# Patient Record
Sex: MALE | Race: WHITE | NOT HISPANIC OR LATINO | Employment: FULL TIME | URBAN - METROPOLITAN AREA
[De-identification: names, ages, dates, MRNs, and addresses within clinical notes are randomized per-mention and may not be internally consistent; named-entity substitution may affect disease eponyms.]

---

## 2017-10-16 ENCOUNTER — OFFICE VISIT (OUTPATIENT)
Dept: URGENT CARE | Facility: CLINIC | Age: 37
End: 2017-10-16
Payer: COMMERCIAL

## 2017-10-16 PROCEDURE — S9083 URGENT CARE CENTER GLOBAL: HCPCS

## 2017-10-16 PROCEDURE — G0382 LEV 3 HOSP TYPE B ED VISIT: HCPCS

## 2017-10-17 NOTE — PROGRESS NOTES
Assessment  1  Conjunctivitis, right eye (372 30) (H10 9)    Plan  Conjunctivitis, right eye    · Tobramycin-Dexamethasone 0 3-0 1 % Ophthalmic Suspension; INSTILL 1 DROP  IN RIGHT EYE EVERY 4 HOURS DAILY    Discussion/Summary  Discussion Summary:   Eye drops R eye every 4 hours while awake; recheck with DR Carlton Ospina in 2-3 days if not improving or vision worsens [phone]  Chief Complaint  Chief Complaint Free Text Note Form: For two days the patient has had a red, swollen and itchy right eye  He removed his contact from the right eye when he developed the swelling and redness  He denies any pain or injury to the eye  History of Present Illness  HPI: R eye red, lids swollen, eye itchy, draining some; did remove contact lens (long wear); eye better today than yesterday   Hospital Based Practices Required Assessment:   Pain Assessment   the patient states they do not have pain  Abuse And Domestic Violence Screen    Yes, the patient is safe at home  -- The patient states no one is hurting them  Depression And Suicide Screen  No, the patient has not had thoughts of hurting themself  No, the patient has not felt depressed in the past 7 days  Prefered Language is  [place]  Primary Language is  English  Review of Systems  Focused-Male:   Constitutional: no fever or chills, feels well, no tiredness, no recent weight loss or weight gain  ENT: no complaints of earache, no loss of hearing, no nosebleeds or nasal discharge, no sore throat or hoarseness  Cardiovascular: no complaints of slow or fast heart rate, no chest pain, no palpitations, no leg claudication or lower extremity edema  Respiratory: no complaints of shortness of breath, no wheezing or cough, no dyspnea on exertion, no orthopnea or PND  Gastrointestinal: no complaints of abdominal pain, no constipation, no nausea or vomiting, no diarrhea or bloody stools     Genitourinary: no complaints of dysuria or incontinence, no hesitancy, no nocturia, no genital lesion, no inadequacy of penile erection  Musculoskeletal: no complaints of arthralgia, no myalgia, no joint swelling or stiffness, no limb pain or swelling  Integumentary: no complaints of skin rash or lesion, no itching or dry skin, no skin wounds  Neurological: no complaints of headache, no confusion, no numbness or tingling, no dizziness or fainting  Other Symptoms: R eye red, itchy, swollen  Physical Exam    Constitutional   General appearance: No acute distress, well appearing and well nourished  Eyes   Conjunctiva and lids: Abnormal  -- lids edematous on R, conjunctiva very injected  Pupils and irises: Abnormal  -- fluorescein exam negative  Ears, Nose, Mouth, and Throat   External inspection of ears and nose: Normal     Otoscopic examination: Tympanic membrance translucent with normal light reflex  Canals patent without erythema  Nasal mucosa, septum, and turbinates: Normal without edema or erythema  Oropharynx: Normal with no erythema, edema, exudate or lesions  Pulmonary   Respiratory effort: No increased work of breathing or signs of respiratory distress  Auscultation of lungs: Clear to auscultation  Cardiovascular   Palpation of heart: Normal PMI, no thrills  Auscultation of heart: Normal rate and rhythm, normal S1 and S2, without murmurs  Examination of extremities for edema and/or varicosities: Normal     Abdomen   Abdomen: Non-tender, no masses  Liver and spleen: No hepatomegaly or splenomegaly  Lymphatic   Palpation of lymph nodes in neck: No lymphadenopathy  Musculoskeletal   Gait and station: Normal     Digits and nails: Normal without clubbing or cyanosis  Inspection/palpation of joints, bones, and muscles: Normal     Skin   Skin and subcutaneous tissue: Normal without rashes or lesions  Neurologic   Cranial nerves: Cranial nerves 2-12 intact  Reflexes: 2+ and symmetric  Sensation: No sensory loss  Psychiatric   Orientation to person, place and time: Normal     Mood and affect: Normal        Signatures   Electronically signed by : GEMMA Fonseca ; Oct 16 2017 11:21AM EST                       (Author)

## 2019-07-29 ENCOUNTER — OFFICE VISIT (OUTPATIENT)
Dept: URGENT CARE | Facility: CLINIC | Age: 39
End: 2019-07-29
Payer: COMMERCIAL

## 2019-07-29 VITALS
WEIGHT: 182.2 LBS | OXYGEN SATURATION: 98 % | TEMPERATURE: 97.6 F | HEART RATE: 78 BPM | BODY MASS INDEX: 29.28 KG/M2 | RESPIRATION RATE: 16 BRPM | HEIGHT: 66 IN | SYSTOLIC BLOOD PRESSURE: 128 MMHG | DIASTOLIC BLOOD PRESSURE: 79 MMHG

## 2019-07-29 DIAGNOSIS — H18.821 CORNEAL ABRASION DUE TO CONTACT LENS, RIGHT: ICD-10-CM

## 2019-07-29 DIAGNOSIS — H57.11 EYE PAIN, RIGHT: Primary | ICD-10-CM

## 2019-07-29 PROCEDURE — 99213 OFFICE O/P EST LOW 20 MIN: CPT | Performed by: EMERGENCY MEDICINE

## 2019-07-29 RX ORDER — OLANZAPINE 15 MG/1
15 TABLET ORAL DAILY
Refills: 0 | COMMUNITY
Start: 2019-07-07

## 2019-07-29 RX ORDER — TOBRAMYCIN 3 MG/ML
1 SOLUTION/ DROPS OPHTHALMIC
Qty: 1 BOTTLE | Refills: 0 | Status: SHIPPED | OUTPATIENT
Start: 2019-07-29 | End: 2021-08-24 | Stop reason: ALTCHOICE

## 2019-07-29 NOTE — PROGRESS NOTES
Assessment/Plan:    No problem-specific Assessment & Plan notes found for this encounter  Diagnoses and all orders for this visit:    Eye pain, right  -     fluorescein sodium sterile 1 mg ophthalmic strip 1 strip    Corneal abrasion due to contact lens, right  -     tobramycin (TOBREX) 0 3 % SOLN; Administer 1 drop to the right eye every 4 (four) hours while awake    Other orders  -     OLANZapine (ZyPREXA) 15 mg tablet; Take 15 mg by mouth daily          Subjective:      Patient ID: Hollis Edwards is a 42 y o  male  Pt c/o pain, redness, tearing R eye since last evening - took contact lenses out  Eye Pain    The right eye is affected  This is a new problem  The current episode started yesterday  The problem occurs constantly  The problem has been unchanged  The injury mechanism was contact lenses  The pain is at a severity of 3/10  The pain is mild  There is no known exposure to pink eye  He wears contacts  Associated symptoms include blurred vision, an eye discharge, eye redness, a foreign body sensation and photophobia  He has tried nothing for the symptoms  The treatment provided no relief  The following portions of the patient's history were reviewed and updated as appropriate: allergies, current medications, past family history, past medical history, past social history, past surgical history and problem list     Review of Systems   Eyes: Positive for blurred vision, photophobia, pain, discharge and redness  All other systems reviewed and are negative  Objective:      /79   Pulse 78   Temp 97 6 °F (36 4 °C)   Resp 16   Ht 5' 6" (1 676 m)   Wt 82 6 kg (182 lb 3 2 oz)   SpO2 98%   BMI 29 41 kg/m²          Physical Exam   Constitutional: He is oriented to person, place, and time  He appears well-developed and well-nourished  HENT:   Nose: Nose normal    Eyes: Pupils are equal, round, and reactive to light  EOM are normal  Right conjunctiva is injected         Neck: Normal range of motion  Cardiovascular: Normal rate  Pulmonary/Chest: Effort normal    Abdominal: Soft  Neurological: He is alert and oriented to person, place, and time  Skin: Skin is warm and dry  Psychiatric: He has a normal mood and affect  His behavior is normal  Judgment and thought content normal    Nursing note and vitals reviewed

## 2021-02-19 ENCOUNTER — OFFICE VISIT (OUTPATIENT)
Dept: URGENT CARE | Facility: CLINIC | Age: 41
End: 2021-02-19
Payer: COMMERCIAL

## 2021-02-19 VITALS
HEIGHT: 66 IN | BODY MASS INDEX: 28.93 KG/M2 | WEIGHT: 180 LBS | TEMPERATURE: 97.4 F | HEART RATE: 70 BPM | RESPIRATION RATE: 16 BRPM | OXYGEN SATURATION: 99 %

## 2021-02-19 DIAGNOSIS — H10.9 CONJUNCTIVITIS OF LEFT EYE, UNSPECIFIED CONJUNCTIVITIS TYPE: Primary | ICD-10-CM

## 2021-02-19 PROCEDURE — 99213 OFFICE O/P EST LOW 20 MIN: CPT | Performed by: PREVENTIVE MEDICINE

## 2021-02-19 RX ORDER — GENTAMICIN SULFATE 3 MG/ML
1 SOLUTION/ DROPS OPHTHALMIC EVERY 4 HOURS
Qty: 5 ML | Refills: 0 | Status: SHIPPED | OUTPATIENT
Start: 2021-02-19 | End: 2021-08-24 | Stop reason: ALTCHOICE

## 2021-02-19 NOTE — PATIENT INSTRUCTIONS
This is the 3rd time you have had a red eye  I believe you need to follow-up as soon as possible with an ophthalmologist   If the eyes not much improved by Monday you must go to the emergency room

## 2021-02-19 NOTE — PROGRESS NOTES
[facility]        NAME: Frank Graham is a 47 y o  male  : 1980    MRN: [id number]  DATE: 2021  TIME: 12:45 PM    Assessment and Plan   Conjunctivitis of left eye, unspecified conjunctivitis type [H10 9]  1  Conjunctivitis of left eye, unspecified conjunctivitis type  gentamicin (GARAMYCIN) 0 3 % ophthalmic solution         Patient Instructions       Follow up with PCP in 3-5 days  Proceed to  ER if symptoms worsen  Chief Complaint     Chief Complaint   Patient presents with    Eye Pain     left eye light sensitivity, redness and watery discharge         History of Present Illness        This is the 3rd time in the last several months these had a reddened irritated left eye  He uses soft contacts  He does not feels if anything got in the eye and the eye does not feel like there is a foreign body in it  Review of Systems   Review of Systems   Eyes: Positive for discharge and redness  Negative for pain and itching  Current Medications       Current Outpatient Medications:     OLANZapine (ZyPREXA) 15 mg tablet, Take 15 mg by mouth daily, Disp: , Rfl: 0    gentamicin (GARAMYCIN) 0 3 % ophthalmic solution, Administer 1 drop into the left eye every 4 (four) hours, Disp: 5 mL, Rfl: 0    tobramycin (TOBREX) 0 3 % SOLN, Administer 1 drop to the right eye every 4 (four) hours while awake (Patient not taking: Reported on 2021), Disp: 1 Bottle, Rfl: 0    Current Allergies     Allergies as of 2021    (No Known Allergies)            The following portions of the patient's history were reviewed and updated as appropriate: allergies, current medications, past family history, past medical history, past social history, past surgical history and problem list      Past Medical History:   Diagnosis Date    Patient denies medical problems 2019       No past surgical history on file      Family History   Problem Relation Age of Onset    Diabetes Mother     Diabetes Father          Medications have been verified  Objective   Pulse 70   Temp (!) 97 4 °F (36 3 °C)   Resp 16   Ht 5' 6" (1 676 m)   Wt 81 6 kg (180 lb)   SpO2 99%   BMI 29 05 kg/m²   No LMP for male patient  Physical Exam     Physical Exam  Eyes:      Comments: The pupil is round equal to light and accommodation  The sclera is erythematous and injected  The conjunctiva is pink

## 2021-07-12 ENCOUNTER — OFFICE VISIT (OUTPATIENT)
Dept: URGENT CARE | Facility: CLINIC | Age: 41
End: 2021-07-12
Payer: COMMERCIAL

## 2021-07-12 VITALS
HEART RATE: 128 BPM | RESPIRATION RATE: 20 BRPM | WEIGHT: 178 LBS | TEMPERATURE: 101.1 F | OXYGEN SATURATION: 96 % | HEIGHT: 66 IN | BODY MASS INDEX: 28.61 KG/M2 | DIASTOLIC BLOOD PRESSURE: 70 MMHG | SYSTOLIC BLOOD PRESSURE: 100 MMHG

## 2021-07-12 DIAGNOSIS — B34.9 ACUTE VIRAL SYNDROME: ICD-10-CM

## 2021-07-12 DIAGNOSIS — R50.9 FEVER, UNSPECIFIED: Primary | ICD-10-CM

## 2021-07-12 PROCEDURE — U0003 INFECTIOUS AGENT DETECTION BY NUCLEIC ACID (DNA OR RNA); SEVERE ACUTE RESPIRATORY SYNDROME CORONAVIRUS 2 (SARS-COV-2) (CORONAVIRUS DISEASE [COVID-19]), AMPLIFIED PROBE TECHNIQUE, MAKING USE OF HIGH THROUGHPUT TECHNOLOGIES AS DESCRIBED BY CMS-2020-01-R: HCPCS | Performed by: PHYSICIAN ASSISTANT

## 2021-07-12 PROCEDURE — G0382 LEV 3 HOSP TYPE B ED VISIT: HCPCS | Performed by: PHYSICIAN ASSISTANT

## 2021-07-12 PROCEDURE — S9083 URGENT CARE CENTER GLOBAL: HCPCS | Performed by: PHYSICIAN ASSISTANT

## 2021-07-12 PROCEDURE — U0005 INFEC AGEN DETEC AMPLI PROBE: HCPCS | Performed by: PHYSICIAN ASSISTANT

## 2021-07-12 NOTE — PROGRESS NOTES
[facility]        NAME: Sweta Givens is a 46 y o  male  : 1980    MRN: [id number]  DATE: 2021  TIME: 9:05 AM    Assessment and Plan   Fever, unspecified [R50 9]  1  Fever, unspecified  Novel Coronavirus (Covid-19),PCR [facility] - Office Collection   2  Acute viral syndrome  Novel Coronavirus (Covid-19),PCR [facility] - Office Collection         Patient Instructions      discussed condition with patient  He has recent onset of fever and flu-like symptoms  We will tested for COVID-19 today and quarantine instructions were given  Recommend hydration, rest, discussed fever management, close observation  He will be notified of results once obtained  Should be re-evaluated if condition persists or worsens  Follow up with PCP in 3-5 days  Proceed to  ER if symptoms worsen  Chief Complaint     Chief Complaint   Patient presents with    Fever     since friday night fever and chills, fever of 101-102         History of Present Illness         Patient presents with onset 3 days ago fever up to 102, chills, myalgias, fatigue  Has slight cough but denies any other URI symptoms, N/V/ D, or known direct exposure to COVID-19 knees fully vaccinated  Denies any other sick contacts  He has been taking Tylenol  Review of Systems   Review of Systems   Constitutional: Positive for chills, fatigue and fever  HENT: Negative  Respiratory: Positive for cough  Negative for shortness of breath  Cardiovascular: Negative  Gastrointestinal: Negative  Genitourinary: Negative  Musculoskeletal: Positive for myalgias           Current Medications       Current Outpatient Medications:     OLANZapine (ZyPREXA) 15 mg tablet, Take 15 mg by mouth daily, Disp: , Rfl: 0    gentamicin (GARAMYCIN) 0 3 % ophthalmic solution, Administer 1 drop into the left eye every 4 (four) hours (Patient not taking: Reported on 2021), Disp: 5 mL, Rfl: 0    tobramycin (TOBREX) 0 3 % SOLN, Administer 1 drop to the right eye every 4 (four) hours while awake (Patient not taking: Reported on 2/19/2021), Disp: 1 Bottle, Rfl: 0    Current Allergies     Allergies as of 07/12/2021    (No Known Allergies)            The following portions of the patient's history were reviewed and updated as appropriate: allergies, current medications, past family history, past medical history, past social history, past surgical history and problem list      Past Medical History:   Diagnosis Date    Patient denies medical problems 07/29/2019    Schizophrenia ([facility] [address] ) 2008       Past Surgical History:   Procedure Laterality Date    MOUTH SURGERY  1998       Family History   Problem Relation Age of Onset    Diabetes Mother     Diabetes Father          Medications have been verified  Objective   /70   Pulse (!) 128   Temp (!) 101 1 °F (38 4 °C) (Temporal)   Resp 20   Ht 5' 6" (1 676 m)   Wt 80 7 kg (178 lb)   SpO2 96%   BMI 28 73 kg/m²   No LMP for male patient  Physical Exam     Physical Exam  Vitals reviewed  Constitutional:       General: He is not in acute distress  Appearance: He is well-developed  HENT:      Mouth/Throat:      Mouth: Mucous membranes are moist       Pharynx: Oropharynx is clear  Cardiovascular:      Rate and Rhythm: Normal rate and regular rhythm  Heart sounds: Normal heart sounds  No murmur heard  Pulmonary:      Effort: Pulmonary effort is normal  No respiratory distress  Breath sounds: Normal breath sounds  Musculoskeletal:      Cervical back: Neck supple  Lymphadenopathy:      Cervical: No cervical adenopathy  Neurological:      Mental Status: He is alert and oriented to person, place, and time

## 2021-07-12 NOTE — PATIENT INSTRUCTIONS
Viral Syndrome   WHAT YOU NEED TO KNOW:   Viral syndrome is a term used for symptoms of an infection caused by a virus  Viruses are spread easily from person to person through the air and on shared items  DISCHARGE INSTRUCTIONS:   Call your local emergency number (911 in the [address] [facility]) or have someone else call if:   · You have a seizure  · You cannot be woken  · You have chest pain or trouble breathing  Return to the emergency department if:   · You have a stiff neck, a bad headache, and sensitivity to light  · You feel weak, dizzy, or confused  · You stop urinating or urinate a lot less than usual     · You cough up blood or thick yellow or green mucus  · You have severe abdominal pain or your abdomen is larger than usual     Call your doctor if:   · Your symptoms do not get better with treatment or get worse after 3 days  · You have a rash or ear pain  · You have burning when you urinate  · You have questions or concerns about your condition or care  Medicines: You may  need any of the following:  · Acetaminophen  decreases pain and fever  It is available without a doctor's order  Ask how much to take and how often to take it  Follow directions  Read the labels of all other medicines you are using to see if they also contain acetaminophen, or ask your doctor or pharmacist  Acetaminophen can cause liver damage if not taken correctly  Do not use more than 4 grams (4,000 milligrams) total of acetaminophen in one day  · NSAIDs , such as ibuprofen, help decrease swelling, pain, and fever  NSAIDs can cause stomach bleeding or kidney problems in certain people  If you take blood thinner medicine, always ask your healthcare provider if NSAIDs are safe for you  Always read the medicine label and follow directions  · Cold medicine  helps decrease swelling, control a cough, and relieve chest or nasal congestion  · Saline nasal spray  helps decrease nasal congestion       · Take your medicine as directed  Contact your healthcare provider if you think your medicine is not helping or if you have side effects  Tell him of her if you are allergic to any medicine  Keep a list of the medicines, vitamins, and herbs you take  Include the amounts, and when and why you take them  Bring the list or the pill bottles to follow-up visits  Carry your medicine list with you in case of an emergency  Manage your symptoms:   · Drink liquids as directed to prevent dehydration  Ask how much liquid to drink each day and which liquids are best for you  Ask if you should drink an oral rehydration solution (ORS)  An ORS has the right amounts of water, salts, and sugar you need to replace body fluids  This may help prevent dehydration caused by vomiting or diarrhea  Do not drink liquids with caffeine  Liquids with caffeine can make dehydration worse  · Get plenty of rest to help your body heal   Take naps throughout the day  Ask your healthcare provider when you can return to work and your normal activities  · Use a cool mist humidifier to help you breathe easier  Ask your healthcare provider how to use a cool mist humidifier  · Eat honey or use cough drops for a sore throat  Cough drops are available without a doctor's order  Follow directions for taking cough drops  · Do not smoke or be close to anyone who is smoking  Nicotine and other chemicals in cigarettes and cigars can cause lung damage  Smoking can also delay healing  Ask your healthcare provider for information if you currently smoke and need help to quit  E-cigarettes or smokeless tobacco still contain nicotine  Talk to your healthcare provider before you use these products  Prevent the spread of germs:       · Wash your hands often  Wash your hands several times each day  Wash after you use the bathroom, change a child's diaper, and before you prepare or eat food  Use soap and water every time   Rub your soapy hands together, lacing your fingers  Wash the front and back of your hands, and in between your fingers  Use the fingers of one hand to scrub under the fingernails of the other hand  Wash for at least 20 seconds  Rinse with warm, running water for several seconds  Then dry your hands with a clean towel or paper towel  Use hand  that contains alcohol if soap and water are not available  Do not touch your eyes, nose, or mouth without washing your hands first          · Cover a sneeze or cough  Use a tissue that covers your mouth and nose  Throw the tissue away in a trash can right away  Use the bend of your arm if a tissue is not available  Wash your hands well with soap and water or use a hand   · Stay away from others while you are sick  Avoid crowds as much as possible  · Ask about vaccines you may need  Talk to your healthcare provider about your vaccine history  He or she will tell you which vaccines you need, and when to get them  ? Get the influenza (flu) vaccine as soon as recommended each year  The flu vaccine is available starting in September or October  Flu viruses change, so it is important to get a flu vaccine every year  ? Get the pneumonia vaccine if recommended  This vaccine is usually recommended every 5 years  Your provider will tell you when to get this vaccine, if needed  Follow up with your doctor as directed:  Write down your questions so you remember to ask them during your visits  © Copyright [address] Information is for End User's use only and may not be sold, redistributed or otherwise used for commercial purposes  All illustrations and images included in CareNotes® are the copyrighted property of [employer]  or [facility] [place]   The above information is an  only  It is not intended as medical advice for individual conditions or treatments   Talk to your doctor, nurse or pharmacist before following any medical regimen to see if it is safe and effective for you

## 2021-07-12 NOTE — LETTER
July 12, 2021     Patient: Homer Condon   YOB: 1980   Date of Visit: 7/12/2021       To Whom it May Concern:    Homer Condon was seen in my clinic on 7/12/2021  He has been instructed to self isolate until further notice  If you have any questions or concerns, please don't hesitate to call           Sincerely,          Tressa Bruno PA-C        CC: No Recipients

## 2021-07-13 LAB — SARS-COV-2 RNA RESP QL NAA+PROBE: NEGATIVE

## 2021-07-17 ENCOUNTER — HOSPITAL ENCOUNTER (EMERGENCY)
Facility: HOSPITAL | Age: 41
Discharge: HOME | End: 2021-07-17
Attending: EMERGENCY MEDICINE | Admitting: EMERGENCY MEDICINE
Payer: COMMERCIAL

## 2021-07-17 ENCOUNTER — OFFICE VISIT (OUTPATIENT)
Dept: URGENT CARE | Facility: CLINIC | Age: 41
End: 2021-07-17
Payer: COMMERCIAL

## 2021-07-17 VITALS
SYSTOLIC BLOOD PRESSURE: 109 MMHG | HEART RATE: 109 BPM | TEMPERATURE: 100.5 F | WEIGHT: 173 LBS | OXYGEN SATURATION: 96 % | RESPIRATION RATE: 20 BRPM | DIASTOLIC BLOOD PRESSURE: 67 MMHG | HEIGHT: 66 IN | BODY MASS INDEX: 27.8 KG/M2

## 2021-07-17 VITALS
DIASTOLIC BLOOD PRESSURE: 64 MMHG | BODY MASS INDEX: 27.93 KG/M2 | OXYGEN SATURATION: 96 % | WEIGHT: 173.8 LBS | HEIGHT: 66 IN | HEART RATE: 108 BPM | SYSTOLIC BLOOD PRESSURE: 100 MMHG | TEMPERATURE: 100.7 F | RESPIRATION RATE: 16 BRPM

## 2021-07-17 DIAGNOSIS — R50.9 FEVER, UNSPECIFIED FEVER CAUSE: ICD-10-CM

## 2021-07-17 DIAGNOSIS — R21 RASH: Primary | ICD-10-CM

## 2021-07-17 DIAGNOSIS — R50.9 FEBRILE ILLNESS: Primary | ICD-10-CM

## 2021-07-17 LAB
ALBUMIN SERPL BCP-MCNC: 3.1 G/DL (ref 3.5–5)
ALP SERPL-CCNC: 86 U/L (ref 46–116)
ALT SERPL W P-5'-P-CCNC: 67 U/L (ref 12–78)
ANION GAP SERPL CALCULATED.3IONS-SCNC: 9 MMOL/L (ref 4–13)
AST SERPL W P-5'-P-CCNC: 47 U/L (ref 5–45)
BACTERIA UR QL AUTO: NORMAL /HPF
BASOPHILS # BLD AUTO: 0.06 THOUSANDS/ΜL (ref 0–0.1)
BASOPHILS NFR BLD AUTO: 1 % (ref 0–1)
BILIRUB SERPL-MCNC: 0.7 MG/DL (ref 0.2–1)
BILIRUB UR QL STRIP: NEGATIVE
BUN SERPL-MCNC: 15 MG/DL (ref 5–25)
CALCIUM ALBUM COR SERPL-MCNC: 9.5 MG/DL (ref 8.3–10.1)
CALCIUM SERPL-MCNC: 8.8 MG/DL (ref 8.3–10.1)
CHLORIDE SERPL-SCNC: 100 MMOL/L (ref 100–108)
CLARITY UR: CLEAR
CO2 SERPL-SCNC: 27 MMOL/L (ref 21–32)
COLOR UR: YELLOW
CREAT SERPL-MCNC: 1.3 MG/DL (ref 0.6–1.3)
EOSINOPHIL # BLD AUTO: 0.03 THOUSAND/ΜL (ref 0–0.61)
EOSINOPHIL NFR BLD AUTO: 0 % (ref 0–6)
ERYTHROCYTE [DISTWIDTH] IN BLOOD BY AUTOMATED COUNT: 11.8 % (ref 11.6–15.1)
GFR SERPL CREATININE-BSD FRML MDRD: 69 ML/MIN/1.73SQ M
GLUCOSE SERPL-MCNC: 109 MG/DL (ref 65–140)
GLUCOSE UR STRIP-MCNC: NEGATIVE MG/DL
HCT VFR BLD AUTO: 36.5 % (ref 36.5–49.3)
HGB BLD-MCNC: 12.3 G/DL (ref 12–17)
HGB UR QL STRIP.AUTO: NEGATIVE
IMM GRANULOCYTES # BLD AUTO: 0.06 THOUSAND/UL (ref 0–0.2)
IMM GRANULOCYTES NFR BLD AUTO: 1 % (ref 0–2)
KETONES UR STRIP-MCNC: ABNORMAL MG/DL
LEUKOCYTE ESTERASE UR QL STRIP: NEGATIVE
LYMPHOCYTES # BLD AUTO: 1.22 THOUSANDS/ΜL (ref 0.6–4.47)
LYMPHOCYTES NFR BLD AUTO: 10 % (ref 14–44)
MCH RBC QN AUTO: 29.9 PG (ref 26.8–34.3)
MCHC RBC AUTO-ENTMCNC: 33.7 G/DL (ref 31.4–37.4)
MCV RBC AUTO: 89 FL (ref 82–98)
MONOCYTES # BLD AUTO: 0.47 THOUSAND/ΜL (ref 0.17–1.22)
MONOCYTES NFR BLD AUTO: 4 % (ref 4–12)
MUCOUS THREADS UR QL AUTO: NORMAL
NEUTROPHILS # BLD AUTO: 11.07 THOUSANDS/ΜL (ref 1.85–7.62)
NEUTS SEG NFR BLD AUTO: 84 % (ref 43–75)
NITRITE UR QL STRIP: NEGATIVE
NON-SQ EPI CELLS URNS QL MICRO: NORMAL /HPF
NRBC BLD AUTO-RTO: 0 /100 WBCS
PH UR STRIP.AUTO: 6 [PH]
PLATELET # BLD AUTO: 439 THOUSANDS/UL (ref 149–390)
PMV BLD AUTO: 9 FL (ref 8.9–12.7)
POTASSIUM SERPL-SCNC: 3.6 MMOL/L (ref 3.5–5.3)
PROT SERPL-MCNC: 7.4 G/DL (ref 6.4–8.2)
PROT UR STRIP-MCNC: ABNORMAL MG/DL
RBC # BLD AUTO: 4.12 MILLION/UL (ref 3.88–5.62)
RBC #/AREA URNS AUTO: NORMAL /HPF
SODIUM SERPL-SCNC: 136 MMOL/L (ref 136–145)
SP GR UR STRIP.AUTO: 1.01 (ref 1–1.03)
UROBILINOGEN UR QL STRIP.AUTO: 2 E.U./DL
WBC # BLD AUTO: 12.91 THOUSAND/UL (ref 4.31–10.16)
WBC #/AREA URNS AUTO: NORMAL /HPF

## 2021-07-17 PROCEDURE — 36415 COLL VENOUS BLD VENIPUNCTURE: CPT | Performed by: PHYSICIAN ASSISTANT

## 2021-07-17 PROCEDURE — 99283 EMERGENCY DEPT VISIT LOW MDM: CPT

## 2021-07-17 PROCEDURE — 86618 LYME DISEASE ANTIBODY: CPT | Performed by: PHYSICIAN ASSISTANT

## 2021-07-17 PROCEDURE — 99284 EMERGENCY DEPT VISIT MOD MDM: CPT | Performed by: PHYSICIAN ASSISTANT

## 2021-07-17 PROCEDURE — 99213 OFFICE O/P EST LOW 20 MIN: CPT | Performed by: NURSE PRACTITIONER

## 2021-07-17 PROCEDURE — 81001 URINALYSIS AUTO W/SCOPE: CPT | Performed by: PHYSICIAN ASSISTANT

## 2021-07-17 PROCEDURE — 86617 LYME DISEASE ANTIBODY: CPT | Performed by: PHYSICIAN ASSISTANT

## 2021-07-17 PROCEDURE — 85025 COMPLETE CBC W/AUTO DIFF WBC: CPT | Performed by: PHYSICIAN ASSISTANT

## 2021-07-17 PROCEDURE — 87040 BLOOD CULTURE FOR BACTERIA: CPT | Performed by: PHYSICIAN ASSISTANT

## 2021-07-17 PROCEDURE — 80053 COMPREHEN METABOLIC PANEL: CPT | Performed by: PHYSICIAN ASSISTANT

## 2021-07-17 RX ORDER — DOXYCYCLINE HYCLATE 100 MG/1
100 CAPSULE ORAL 2 TIMES DAILY
Qty: 42 CAPSULE | Refills: 0 | Status: SHIPPED | OUTPATIENT
Start: 2021-07-17 | End: 2021-08-07

## 2021-07-17 NOTE — PATIENT INSTRUCTIONS
Acute Rash   WHAT YOU NEED TO KNOW:   A rash is irritated, red, or itchy skin or mucus membranes, such as the lining of your nose or throat  Acute means the rash starts suddenly, worsens quickly, and lasts a short time  Common causes include a disease or infection, a reaction to something you are allergic to, or certain medicines  DISCHARGE INSTRUCTIONS:   Return to the emergency department if:   · You have sudden trouble breathing or chest pain  · You are vomiting, have a headache or muscle aches, and your throat hurts  Call your doctor or dermatologist if:   · You have a fever  · You get open wounds from scratching your skin, or you have a wound that is red, swollen, or painful  · Your rash lasts longer than 3 months  · You have swelling or pain in your joints  · You have questions or concerns about your condition or care  Medicines:  If your rash does not go away on its own, you may need the following medicines:  · Antihistamines  may be given to help decrease itching  · Steroids  may be given to decrease inflammation  · Antibiotics  help fight or prevent a bacterial infection  · Take your medicine as directed  Contact your healthcare provider if you think your medicine is not helping or if you have side effects  Tell him of her if you are allergic to any medicine  Keep a list of the medicines, vitamins, and herbs you take  Include the amounts, and when and why you take them  Bring the list or the pill bottles to follow-up visits  Carry your medicine list with you in case of an emergency  Prevent a rash or care for your skin when you have a rash:  Dry skin can lead to more problems  Do not scratch your skin if it itches  You may cause a skin infection by scratching  The following may prevent dry skin, and help your skin look better:  · Help soothe your rash  Apply thick cream lotions or petroleum jelly  Cool compresses may also soothe your skin   Apply a cool compress or a cool, wet towel, and then cover it with a dry towel  · Use lukewarm water when you bathe  Hot water may damage your skin more  Pat your skin dry  Do not rub your skin with a towel  · Use detergents, soaps, shampoos, and bubble baths  made for sensitive skin  · Wear clothes made of cotton instead of nylon or wool  Cotton is softer, so it will not hurt your skin as much  Follow up with your healthcare providers as directed:  A dermatologist may help find the cause of your rash or help plan or change treatment  A dietitian may help with meal planning if you have a food allergy  Write down your questions so you remember to ask them during your visits  © Copyright [address] Information is for End User's use only and may not be sold, redistributed or otherwise used for commercial purposes  All illustrations and images included in CareNotes® are the copyrighted property of [employer]  or [facility] Con[place] Singletary   The above information is an  only  It is not intended as medical advice for individual conditions or treatments  Talk to your doctor, nurse or pharmacist before following any medical regimen to see if it is safe and effective for you

## 2021-07-17 NOTE — PROGRESS NOTES
[facility] [employer] [facility]        NAME: Homer Condon is a 46 y o  male  : 1980    MRN: [id number]  DATE: 2021  TIME: 3:16 PM    Assessment and Plan   Rash [R21]  1  Rash     2  Fever, unspecified fever cause       Symmetrical round and oval red warm markings on torso   Ongoing fevers -  Does not feel well  covid negative this week   No pcp   Refer to ED for labs/work up   Pt prefers [facility] - transfer sent    Patient Instructions     Follow up with PCP in 3-5 days  Proceed to  ER if symptoms worsen  Chief Complaint     Chief Complaint   Patient presents with    Rash     Onset last week has fever and chills then went into seeing a rash   Rash all over body and not itchy  Feeling foggy, fatigued, and some nausea,          History of Present Illness   Homer Condon presents to the clinic c/o    Onset last week has fever and chills and fatigue  Rash all over body notice yesterday and not itchy  Feeling foggy, fatigued, and some nausea  Mom had something similar and was diagnosed with lyme disease  Seen 3 days ago for URI symptoms at [facility] and was swabbed for covid - those results are negative  Does not have a PCP  Has a NP PCP appt end of August   Denies seeing or having any bugs bite him   No known ticks on him  Continues with low grade fevers  Review of Systems   Review of Systems   All other systems reviewed and are negative          Current Medications     Long-Term Medications   Medication Sig Dispense Refill    OLANZapine (ZyPREXA) 15 mg tablet Take 15 mg by mouth daily  0       Current Allergies     Allergies as of 2021    (No Known Allergies)            The following portions of the patient's history were reviewed and updated as appropriate: allergies, current medications, past family history, past medical history, past social history, past surgical history and problem list     Objective   /64   Pulse (!) 108   Temp (!) 100 7 °F (38 2 °C) (Tympanic)   Resp 16  5' 6" (1 676 m)   Wt 78 8 kg (173 lb 12 8 oz)   SpO2 96%   BMI 28 05 kg/m²        Physical Exam     Physical Exam  Vitals and nursing note reviewed  Constitutional:       Appearance: Normal appearance  He is well-developed  HENT:      Head: Normocephalic and atraumatic  Eyes:      General: Lids are normal       Conjunctiva/sclera: Conjunctivae normal       Pupils: Pupils are equal, round, and reactive to light  Cardiovascular:      Rate and Rhythm: Normal rate and regular rhythm  Heart sounds: Normal heart sounds, S1 normal and S2 normal    Pulmonary:      Effort: Pulmonary effort is normal       Breath sounds: Normal breath sounds  Skin:     General: Skin is warm and dry  Findings: Rash present  Comments: Large flat symmetrical - round or oval all over torso  Warm to touch   Neurological:      Mental Status: He is alert  Psychiatric:         Speech: Speech normal          Behavior: Behavior normal          Thought Content:  Thought content normal          Judgment: Judgment normal

## 2021-07-17 NOTE — ED PROVIDER NOTES
History  Chief Complaint   Patient presents with    Rash     Pt presents to the ER with a rash on abdomen, legs, back, chest  Pt reports having a fever x1 week, serious fever last weekend  Pt reports feeling warm and fatigued  81-year-old male with history of schizophrenia presents emergency department for evaluation of fever x8 days associated with newly developing rash over the past 3-4 days  States that fever was very high in the initial onset of symptoms however has become low-grade over the past several days  He states he otherwise feels well with the exception of mild fatigue  Denies any cough, chest pain, nausea, vomiting, diarrhea, dysuria, or neck pain  Prior to Admission Medications   Prescriptions Last Dose Informant Patient Reported? Taking? OLANZapine (ZyPREXA) 15 mg tablet   Yes No   Sig: Take 15 mg by mouth daily   gentamicin (GARAMYCIN) 0 3 % ophthalmic solution   No No   Sig: Administer 1 drop into the left eye every 4 (four) hours   Patient not taking: Reported on 7/12/2021   tobramycin (TOBREX) 0 3 % SOLN   No No   Sig: Administer 1 drop to the right eye every 4 (four) hours while awake   Patient not taking: Reported on 2/19/2021      Facility-Administered Medications: None       Past Medical History:   Diagnosis Date    Patient denies medical problems 07/29/2019    Schizophrenia ([facility] [address] ) 2008       Past Surgical History:   Procedure Laterality Date    MOUTH SURGERY  1998       Family History   Problem Relation Age of Onset    Diabetes Mother     Diabetes Father      I have reviewed and agree with the history as documented      E-Cigarette/Vaping    E-Cigarette Use Never User      E-Cigarette/Vaping Substances     Social History     Tobacco Use    Smoking status: Former Smoker    Smokeless tobacco: Never Used   Vaping Use    Vaping Use: Never used   Substance Use Topics    Alcohol use: Yes     Comment: socially    Drug use: Never       Review of Systems Constitutional: Positive for fever  Negative for chills and diaphoresis  Eyes: Negative for visual disturbance  Respiratory: Negative for cough and shortness of breath  Cardiovascular: Negative for chest pain and palpitations  Gastrointestinal: Negative for abdominal pain, diarrhea, nausea and vomiting  Genitourinary: Negative for dysuria, flank pain and frequency  Musculoskeletal: Negative for arthralgias and myalgias  Skin: Positive for rash  Negative for color change and wound  Allergic/Immunologic: Negative for immunocompromised state  Neurological: Negative for dizziness and light-headedness  Hematological: Does not bruise/bleed easily  Psychiatric/Behavioral: Negative for confusion  The patient is not nervous/anxious  Physical Exam  Physical Exam  Vitals and nursing note reviewed  Constitutional:       Appearance: He is well-developed  HENT:      Head: Normocephalic and atraumatic  Mouth/Throat:      Mouth: Mucous membranes are moist    Eyes:      Conjunctiva/sclera: Conjunctivae normal    Cardiovascular:      Rate and Rhythm: Normal rate and regular rhythm  Heart sounds: No murmur heard  Pulmonary:      Effort: Pulmonary effort is normal  No respiratory distress  Breath sounds: Normal breath sounds  Abdominal:      Palpations: Abdomen is soft  Tenderness: There is no abdominal tenderness  Musculoskeletal:      Cervical back: Neck supple  Skin:     General: Skin is warm and dry  Capillary Refill: Capillary refill takes less than 2 seconds  Findings: Rash present  Comments: See clinical images  Ovoid erythematous, nonpalpable, blanchable lesions to the torso    Neurological:      General: No focal deficit present  Mental Status: He is alert and oriented to person, place, and time     Psychiatric:         Mood and Affect: Mood normal          Behavior: Behavior normal          Vital Signs  ED Triage Vitals [07/17/21 1543] Temperature Pulse Respirations Blood Pressure SpO2   100 5 °F (38 1 °C) (!) 114 (!) 24 112/74 97 %      Temp Source Heart Rate Source Patient Position - Orthostatic VS BP Location FiO2 (%)   Oral Monitor Sitting Right arm --      Pain Score       1           Vitals:    07/17/21 1543 07/17/21 1600   BP: 112/74 109/67   Pulse: (!) 114 (!) 109   Patient Position - Orthostatic VS: Sitting          Visual Acuity      ED Medications  Medications - No data to display    Diagnostic Studies  Results Reviewed     Procedure Component Value Units Date/Time    Urine Microscopic [[id number]]  (Normal) Collected: 07/17/21 1615    Lab Status: Final result Specimen: Urine, Clean Catch Updated: 07/17/21 1658     RBC, UA None Seen /hpf      WBC, UA None Seen /hpf      Epithelial Cells None Seen /hpf      Bacteria, UA None Seen /hpf      MUCUS THREADS None Seen    Comprehensive metabolic panel [[id number]]  (Abnormal) Collected: 07/17/21 1615    Lab Status: Final result Specimen: Blood from Line Updated: 07/17/21 1651     Sodium 136 mmol/L      Potassium 3 6 mmol/L      Chloride 100 mmol/L      CO2 27 mmol/L      ANION GAP 9 mmol/L      BUN 15 mg/dL      Creatinine 1 30 mg/dL      Glucose 109 mg/dL      Calcium 8 8 mg/dL      Corrected Calcium 9 5 mg/dL      AST 47 U/L      ALT 67 U/L      Alkaline Phosphatase 86 U/L      Total Protein 7 4 g/dL      Albumin 3 1 g/dL      Total Bilirubin 0 70 mg/dL      eGFR 69 ml/min/1 73sq m     Narrative:      Shawn guidelines for Chronic Kidney Disease (CKD):     Stage 1 with normal or high GFR (GFR > 90 mL/min/1 73 square meters)    Stage 2 Mild CKD (GFR = 60-89 mL/min/1 73 square meters)    Stage 3A Moderate CKD (GFR = 45-59 mL/min/1 73 square meters)    Stage 3B Moderate CKD (GFR = 30-44 mL/min/1 73 square meters)    Stage 4 Severe CKD (GFR = 15-29 mL/min/1 73 square meters)    Stage 5 End Stage CKD (GFR <15 mL/min/1 73 square meters)  Note: GFR calculation is accurate only with a steady state creatinine    UA w Reflex to Microscopic w Reflex to Culture [[id number]]  (Abnormal) Collected: 07/17/21 1615    Lab Status: Final result Specimen: Urine, Clean Catch Updated: 07/17/21 1637     Color, UA Yellow     Clarity, UA Clear     Specific Gravity, UA 1 015     pH, UA 6 0     Leukocytes, UA Negative     Nitrite, UA Negative     Protein, UA Trace mg/dl      Glucose, UA Negative mg/dl      Ketones, UA 15 (1+) mg/dl      Urobilinogen, UA 2 0 E U /dl      Bilirubin, UA Negative     Blood, UA Negative    CBC and differential [[id number]]  (Abnormal) Collected: 07/17/21 1615    Lab Status: Final result Specimen: Blood from Line Updated: 07/17/21 1635     WBC 12 91 Thousand/uL      RBC 4 12 Million/uL      Hemoglobin 12 3 g/dL      Hematocrit 36 5 %      MCV 89 fL      MCH 29 9 pg      MCHC 33 7 g/dL      RDW 11 8 %      MPV 9 0 fL      Platelets 121 Thousands/uL      nRBC 0 /100 WBCs      Neutrophils Relative 84 %      Immat GRANS % 1 %      Lymphocytes Relative 10 %      Monocytes Relative 4 %      Eosinophils Relative 0 %      Basophils Relative 1 %      Neutrophils Absolute 11 07 Thousands/µL      Immature Grans Absolute 0 06 Thousand/uL      Lymphocytes Absolute 1 22 Thousands/µL      Monocytes Absolute 0 47 Thousand/µL      Eosinophils Absolute 0 03 Thousand/µL      Basophils Absolute 0 06 Thousands/µL     Lyme Antibody Profile with reflex to [facility] [[id number]] Collected: 07/17/21 1615    Lab Status: In process Specimen: Blood from Arm, Right Updated: 07/17/21 1632    Blood culture #2 [[id number]] Collected: 07/17/21 1615    Lab Status: In process Specimen: Blood from Arm, Left Updated: 07/17/21 1631    Blood culture #1 [[id number]] Collected: 07/17/21 1615    Lab Status:  In process Specimen: Blood from Line, Venous Updated: 07/17/21 1631                 No orders to display              Procedures  Procedures         ED Course                                           MDM  Number of Diagnoses or Management Options  Febrile illness: new and requires workup  Diagnosis management comments:   Suspicious for acute Lyme disease with disseminated rash  Patient's labs are reassuring  Will treat empirically pending results       Amount and/or Complexity of Data Reviewed  Clinical lab tests: ordered and reviewed  Tests in the medicine section of CPT®: ordered and reviewed  Review and summarize past medical records: yes        Disposition  Final diagnoses:   Febrile illness     Time reflects when diagnosis was documented in both MDM as applicable and the Disposition within this note     Time User Action Codes Description Comment    7/17/2021  4:56 PM Lul Newmanarminda Add [R50 9] Febrile illness       ED Disposition     ED Disposition Condition Date/Time Comment    Discharge Stable Sat Jul 17, 2021  4:56 PM Sylvia Doing discharge to home/self care  Follow-up Information    None         Discharge Medication List as of 7/17/2021  4:56 PM      START taking these medications    Details   doxycycline hyclate (VIBRAMYCIN) 100 mg capsule Take 1 capsule (100 mg total) by mouth 2 (two) times a day for 21 days, Starting Sat 7/17/2021, Until Sat 8/7/2021, Normal         CONTINUE these medications which have NOT CHANGED    Details   gentamicin (GARAMYCIN) 0 3 % ophthalmic solution Administer 1 drop into the left eye every 4 (four) hours, Starting Fri 2/19/2021, Normal      OLANZapine (ZyPREXA) 15 mg tablet Take 15 mg by mouth daily, Starting Sun 7/7/2019, Historical Med      tobramycin (TOBREX) 0 3 % SOLN Administer 1 drop to the right eye every 4 (four) hours while awake, Starting Mon 7/29/2019, Normal           No discharge procedures on file      PDMP Review     None          ED Provider  Electronically Signed by           Cailin Zacarias PA-C  07/17/21 3280

## 2021-07-20 LAB — B BURGDOR IGG+IGM SER-ACNC: 688

## 2021-07-21 LAB
B BURGDOR IGG PATRN SER IB-IMP: NEGATIVE
B BURGDOR IGM PATRN SER IB-IMP: POSITIVE
B BURGDOR18KD IGG SER QL IB: ABNORMAL
B BURGDOR23KD IGG SER QL IB: ABNORMAL
B BURGDOR23KD IGM SER QL IB: PRESENT
B BURGDOR28KD IGG SER QL IB: ABNORMAL
B BURGDOR30KD IGG SER QL IB: ABNORMAL
B BURGDOR39KD IGG SER QL IB: ABNORMAL
B BURGDOR39KD IGM SER QL IB: PRESENT
B BURGDOR41KD IGG SER QL IB: PRESENT
B BURGDOR41KD IGM SER QL IB: PRESENT
B BURGDOR45KD IGG SER QL IB: ABNORMAL
B BURGDOR58KD IGG SER QL IB: ABNORMAL
B BURGDOR66KD IGG SER QL IB: ABNORMAL
B BURGDOR93KD IGG SER QL IB: ABNORMAL

## 2021-07-23 LAB
BACTERIA BLD CULT: NORMAL
BACTERIA BLD CULT: NORMAL

## 2021-08-24 ENCOUNTER — OFFICE VISIT (OUTPATIENT)
Dept: FAMILY MEDICINE CLINIC | Facility: CLINIC | Age: 41
End: 2021-08-24
Payer: COMMERCIAL

## 2021-08-24 VITALS
WEIGHT: 169 LBS | HEART RATE: 101 BPM | SYSTOLIC BLOOD PRESSURE: 120 MMHG | TEMPERATURE: 98.7 F | OXYGEN SATURATION: 96 % | RESPIRATION RATE: 18 BRPM | HEIGHT: 65 IN | BODY MASS INDEX: 28.16 KG/M2 | DIASTOLIC BLOOD PRESSURE: 82 MMHG

## 2021-08-24 DIAGNOSIS — Z23 ENCOUNTER FOR IMMUNIZATION: ICD-10-CM

## 2021-08-24 DIAGNOSIS — Z13.1 SCREENING FOR DIABETES MELLITUS: ICD-10-CM

## 2021-08-24 DIAGNOSIS — F20.9 SCHIZOPHRENIA, UNSPECIFIED TYPE (HCC): ICD-10-CM

## 2021-08-24 DIAGNOSIS — R59.1 LYMPHADENOPATHY: ICD-10-CM

## 2021-08-24 DIAGNOSIS — Z13.6 SCREENING FOR CARDIOVASCULAR CONDITION: ICD-10-CM

## 2021-08-24 DIAGNOSIS — K21.9 GASTROESOPHAGEAL REFLUX DISEASE, UNSPECIFIED WHETHER ESOPHAGITIS PRESENT: ICD-10-CM

## 2021-08-24 DIAGNOSIS — R22.0 MASS OF RIGHT SUBMANDIBULAR REGION: ICD-10-CM

## 2021-08-24 DIAGNOSIS — Z00.00 ANNUAL PHYSICAL EXAM: Primary | ICD-10-CM

## 2021-08-24 PROCEDURE — 90715 TDAP VACCINE 7 YRS/> IM: CPT | Performed by: FAMILY MEDICINE

## 2021-08-24 PROCEDURE — 1036F TOBACCO NON-USER: CPT | Performed by: FAMILY MEDICINE

## 2021-08-24 PROCEDURE — 3008F BODY MASS INDEX DOCD: CPT | Performed by: FAMILY MEDICINE

## 2021-08-24 PROCEDURE — 99385 PREV VISIT NEW AGE 18-39: CPT | Performed by: FAMILY MEDICINE

## 2021-08-24 PROCEDURE — 3725F SCREEN DEPRESSION PERFORMED: CPT | Performed by: FAMILY MEDICINE

## 2021-08-24 PROCEDURE — 90471 IMMUNIZATION ADMIN: CPT | Performed by: FAMILY MEDICINE

## 2021-08-24 NOTE — ASSESSMENT & PLAN NOTE
BMI Counseling: Body mass index is 27 99 kg/m²  The BMI is above normal  Nutrition recommendations include reducing portion sizes, decreasing overall calorie intake and 3-5 servings of fruits/vegetables daily  Exercise recommendations include exercising 3-5 times per week

## 2021-08-24 NOTE — PATIENT INSTRUCTIONS

## 2021-08-24 NOTE — PROGRESS NOTES
[facility]    NAME: Jorgito Darling  AGE: 46 y o  SEX: male  : 1980     DATE: 2021     Assessment and Plan:     Problem List Items Addressed This Visit        Digestive    GERD (gastroesophageal reflux disease)     Taking OTC prilosec          Relevant Orders    Lipid panel    Comprehensive metabolic panel    CBC and differential    TSH, 3rd generation with Free T4 reflex    UA (URINE) with reflex to Scope    US head neck soft tissue       Immune and Lymphatic    Lymphadenopathy    Relevant Orders    Lipid panel    Comprehensive metabolic panel    CBC and differential    TSH, 3rd generation with Free T4 reflex    UA (URINE) with reflex to Scope    US head neck soft tissue       Other    Schizophrenia ([facility] [address] )     Follows with [facility], on medication          Relevant Orders    Lipid panel    Comprehensive metabolic panel    CBC and differential    TSH, 3rd generation with Free T4 reflex    UA (URINE) with reflex to Scope    BMI 27 0-27 9,adult     BMI Counseling: Body mass index is 27 99 kg/m²  The BMI is above normal  Nutrition recommendations include reducing portion sizes, decreasing overall calorie intake and 3-5 servings of fruits/vegetables daily  Exercise recommendations include exercising 3-5 times per week           Relevant Orders    Lipid panel    Comprehensive metabolic panel    CBC and differential    TSH, 3rd generation with Free T4 reflex    UA (URINE) with reflex to Scope    Mass of right submandibular region    Relevant Orders    Lipid panel    Comprehensive metabolic panel    CBC and differential    TSH, 3rd generation with Free T4 reflex    UA (URINE) with reflex to Scope    US head neck soft tissue      Other Visit Diagnoses     Annual physical exam    -  Primary    Relevant Orders    Lipid panel    Comprehensive metabolic panel    CBC and differential    TSH, 3rd generation with Free T4 reflex UA (URINE) with reflex to Scope    Encounter for immunization        Relevant Orders    TDAP VACCINE GREATER THAN OR EQUAL TO 6YO IM (Completed)    Screening for diabetes mellitus        Relevant Orders    Lipid panel    Comprehensive metabolic panel    Screening for cardiovascular condition        Relevant Orders    Lipid panel    Comprehensive metabolic panel          Immunizations and preventive care screenings were discussed with patient today  Appropriate education was printed on patient's after visit summary  Counseling:  Alcohol/drug use: discussed moderation in alcohol intake, the recommendations for healthy alcohol use, and avoidance of illicit drug use  Dental Health: discussed importance of regular tooth brushing, flossing, and dental visits  Injury prevention: discussed safety/seat belts, safety helmets, smoke detectors, carbon dioxide detectors, and smoking near bedding or upholstery  Sexual health: discussed sexually transmitted diseases, partner selection, use of condoms, avoidance of unintended pregnancy, and contraceptive alternatives  · Exercise: the importance of regular exercise/physical activity was discussed  Recommend exercise 3-5 times per week for at least 30 minutes  Return in about 1 year (around 8/24/2022) for Annual physical      Chief Complaint:     Chief Complaint   Patient presents with   [facility] Establish Care     No new or ongoing issues to be addressed today per patient   Physical Exam      History of Present Illness:     Adult Annual Physical   Patient here for a comprehensive physical exam  He is a new patient  PMH: diagnosed with schizophrenia in 2008- follows with [facility]       Diet and Physical Activity  · Diet/Nutrition: well balanced diet  · Exercise: moderate cardiovascular exercise        Depression Screening  PHQ-9 Depression Screening    PHQ-9:   Frequency of the following problems over the past two weeks:      Little interest or pleasure in doing things: 0 - not at all  Feeling down, depressed, or hopeless: 0 - not at all  PHQ-2 Score: 0          Review of Systems:     Review of Systems   Constitutional: Negative for chills and fever  HENT: Negative for congestion, postnasal drip, rhinorrhea and sinus pain  Eyes: Negative for photophobia and visual disturbance  Respiratory: Negative for cough and shortness of breath  Cardiovascular: Negative for chest pain, palpitations and leg swelling  Gastrointestinal: Negative for abdominal pain, constipation, diarrhea, nausea and vomiting  Genitourinary: Negative for difficulty urinating and dysuria  Musculoskeletal: Negative for arthralgias and myalgias  Skin: Negative for rash  Neurological: Negative for dizziness and syncope  Past Medical History:     Past Medical History:   Diagnosis Date    Lyme disease     Patient denies medical problems 07/29/2019    Schizophrenia ([facility] ) 2008      Past Surgical History:     Past Surgical History:   Procedure Laterality Date    MOUTH SURGERY  1998      Social History:     Social History     Socioeconomic History    Marital status: Single     Spouse name: None    Number of children: None    Years of education: None    Highest education level: None   Occupational History    None   Tobacco Use    Smoking status: Former Smoker     Packs/day: 0 25     Types: Cigarettes     Start date: 2002     Quit date: 2018     Years since quitting: 3 6    Smokeless tobacco: Never Used    Tobacco comment: Smokes socially   Vaping Use    Vaping Use: Never used   Substance and Sexual Activity    Alcohol use:  Yes     Alcohol/week: 3 0 - 4 0 standard drinks     Types: 3 - 4 Standard drinks or equivalent per week    Drug use: Never    Sexual activity: Yes   Other Topics Concern    None   Social History Narrative    None     Social Determinants of Health     Financial Resource Strain:     Difficulty of Paying Living Expenses:    Food Insecurity:     Worried About Running Out of Food in the Last Year:     Linsey of Food in the Last Year:    Transportation Needs:     Lack of Transportation (Medical):  Lack of Transportation (Non-Medical):    Physical Activity: Sufficiently Active    Days of Exercise per Week: 3 days    Minutes of Exercise per Session: 60 min   Stress: No Stress Concern Present    Feeling of Stress : Not at all   Social Connections:     Frequency of Communication with Friends and Family:     Frequency of Social Gatherings with Friends and Family:     Attends Jew Services:     Active Member of Clubs or Organizations:     Attends Club or Organization Meetings:     Marital Status:    Intimate Partner Violence: Not At Risk    Fear of Current or Ex-Partner: No    Emotionally Abused: No    Physically Abused: No    Sexually Abused: No      Family History:     Family History   Problem Relation Age of Onset    Diabetes Mother     Diabetes Father       Current Medications:     Current Outpatient Medications   Medication Sig Dispense Refill    OLANZapine (ZyPREXA) 15 mg tablet Take 15 mg by mouth daily  0     No current facility-administered medications for this visit  Allergies:     No Known Allergies   Physical Exam:     /82   Pulse 101   Temp 98 7 °F (37 1 °C) (Tympanic)   Resp 18   Ht 5' 5 16" (1 655 m)   Wt 76 7 kg (169 lb)   SpO2 96%   BMI 27 99 kg/m²     Physical Exam  Constitutional:       General: He is not in acute distress  Appearance: Normal appearance  He is not ill-appearing, toxic-appearing or diaphoretic  HENT:      Head: Normocephalic and atraumatic  Right Ear: Tympanic membrane and ear canal normal       Left Ear: Tympanic membrane and ear canal normal       Nose: Nose normal  No congestion  Mouth/Throat:      Mouth: Mucous membranes are moist       Pharynx: Oropharynx is clear  No oropharyngeal exudate  Eyes:      Extraocular Movements: Extraocular movements intact  Conjunctiva/sclera: Conjunctivae normal       Pupils: Pupils are equal, round, and reactive to light  Neck:      Comments: palpable abnormality noted right submandibular area, non tender   Cardiovascular:      Rate and Rhythm: Normal rate and regular rhythm  Pulses: Normal pulses  Heart sounds: No murmur heard  Pulmonary:      Effort: Pulmonary effort is normal       Breath sounds: Normal breath sounds  No wheezing, rhonchi or rales  Abdominal:      General: Bowel sounds are normal  There is no distension  Palpations: Abdomen is soft  Tenderness: There is no abdominal tenderness  Musculoskeletal:         General: No swelling or tenderness  Normal range of motion  Cervical back: Normal range of motion and neck supple  Skin:     General: Skin is warm and dry  Capillary Refill: Capillary refill takes less than 2 seconds  Neurological:      General: No focal deficit present  Mental Status: He is alert and oriented to person, place, and time  Cranial Nerves: No cranial nerve deficit  Psychiatric:         Mood and Affect: Mood normal          Behavior: Behavior normal          Thought Content:  Thought content normal           Von Ewings, DO   [address]

## 2021-08-26 LAB
ALBUMIN SERPL-MCNC: 4.5 G/DL (ref 4–5)
ALBUMIN/GLOB SERPL: 1.6 {RATIO} (ref 1.2–2.2)
ALP SERPL-CCNC: 78 IU/L (ref 48–121)
ALT SERPL-CCNC: 23 IU/L (ref 0–44)
APPEARANCE UR: CLEAR
AST SERPL-CCNC: 15 IU/L (ref 0–40)
BASOPHILS # BLD AUTO: 0 X10E3/UL (ref 0–0.2)
BASOPHILS NFR BLD AUTO: 0 %
BILIRUB SERPL-MCNC: 0.5 MG/DL (ref 0–1.2)
BILIRUB UR QL STRIP: NEGATIVE
BUN SERPL-MCNC: 16 MG/DL (ref 6–20)
BUN/CREAT SERPL: 18 (ref 9–20)
CALCIUM SERPL-MCNC: 9.4 MG/DL (ref 8.7–10.2)
CHLORIDE SERPL-SCNC: 103 MMOL/L (ref 96–106)
CHOLEST SERPL-MCNC: 222 MG/DL (ref 100–199)
CHOLEST/HDLC SERPL: 4.5 RATIO (ref 0–5)
CO2 SERPL-SCNC: 21 MMOL/L (ref 20–29)
COLOR UR: YELLOW
CREAT SERPL-MCNC: 0.91 MG/DL (ref 0.76–1.27)
EOSINOPHIL # BLD AUTO: 0.1 X10E3/UL (ref 0–0.4)
EOSINOPHIL NFR BLD AUTO: 2 %
ERYTHROCYTE [DISTWIDTH] IN BLOOD BY AUTOMATED COUNT: 13.3 % (ref 11.6–15.4)
GLOBULIN SER-MCNC: 2.8 G/DL (ref 1.5–4.5)
GLUCOSE SERPL-MCNC: 101 MG/DL (ref 65–99)
GLUCOSE UR QL: NEGATIVE
HCT VFR BLD AUTO: 45 % (ref 37.5–51)
HDLC SERPL-MCNC: 49 MG/DL
HGB BLD-MCNC: 14.6 G/DL (ref 13–17.7)
HGB UR QL STRIP: NEGATIVE
IMM GRANULOCYTES # BLD: 0 X10E3/UL (ref 0–0.1)
IMM GRANULOCYTES NFR BLD: 0 %
KETONES UR QL STRIP: NEGATIVE
LDLC SERPL CALC-MCNC: 142 MG/DL (ref 0–99)
LEUKOCYTE ESTERASE UR QL STRIP: NEGATIVE
LYMPHOCYTES # BLD AUTO: 2.1 X10E3/UL (ref 0.7–3.1)
LYMPHOCYTES NFR BLD AUTO: 30 %
MCH RBC QN AUTO: 29.2 PG (ref 26.6–33)
MCHC RBC AUTO-ENTMCNC: 32.4 G/DL (ref 31.5–35.7)
MCV RBC AUTO: 90 FL (ref 79–97)
MICRO URNS: NORMAL
MONOCYTES # BLD AUTO: 0.6 X10E3/UL (ref 0.1–0.9)
MONOCYTES NFR BLD AUTO: 8 %
NEUTROPHILS # BLD AUTO: 4.1 X10E3/UL (ref 1.4–7)
NEUTROPHILS NFR BLD AUTO: 60 %
NITRITE UR QL STRIP: NEGATIVE
PH UR STRIP: 6 [PH] (ref 5–7.5)
PLATELET # BLD AUTO: 328 X10E3/UL (ref 150–450)
POTASSIUM SERPL-SCNC: 4.4 MMOL/L (ref 3.5–5.2)
PROT SERPL-MCNC: 7.3 G/DL (ref 6–8.5)
PROT UR QL STRIP: NEGATIVE
RBC # BLD AUTO: 5 X10E6/UL (ref 4.14–5.8)
SL AMB EGFR AFRICAN AMERICAN: 122 ML/MIN/1.73
SL AMB EGFR NON AFRICAN AMERICAN: 106 ML/MIN/1.73
SL AMB VLDL CHOLESTEROL CALC: 31 MG/DL (ref 5–40)
SODIUM SERPL-SCNC: 142 MMOL/L (ref 134–144)
SP GR UR: 1.02 (ref 1–1.03)
TRIGL SERPL-MCNC: 171 MG/DL (ref 0–149)
TSH SERPL DL<=0.005 MIU/L-ACNC: 1.9 UIU/ML (ref 0.45–4.5)
UROBILINOGEN UR STRIP-ACNC: 1 MG/DL (ref 0.2–1)
WBC # BLD AUTO: 7 X10E3/UL (ref 3.4–10.8)

## 2022-08-19 ENCOUNTER — RA CDI HCC (OUTPATIENT)
Dept: OTHER | Facility: HOSPITAL | Age: 42
End: 2022-08-19

## 2022-08-19 NOTE — PROGRESS NOTES
[facility]  coding opportunities       Chart reviewed, no opportunity found: CHART REVIEWED, NO OPPORTUNITY FOUND        Patients Insurance        Commercial Insurance: [address] [place] [address]

## 2022-10-09 ENCOUNTER — OFFICE VISIT (OUTPATIENT)
Dept: URGENT CARE | Facility: CLINIC | Age: 42
End: 2022-10-09
Payer: COMMERCIAL

## 2022-10-09 VITALS
HEART RATE: 96 BPM | RESPIRATION RATE: 18 BRPM | OXYGEN SATURATION: 98 % | HEIGHT: 66 IN | BODY MASS INDEX: 28.93 KG/M2 | WEIGHT: 180 LBS | TEMPERATURE: 97 F

## 2022-10-09 DIAGNOSIS — R05.1 ACUTE COUGH: Primary | ICD-10-CM

## 2022-10-09 LAB
SARS-COV-2 AG UPPER RESP QL IA: NEGATIVE
VALID CONTROL: NORMAL

## 2022-10-09 PROCEDURE — 87811 SARS-COV-2 COVID19 W/OPTIC: CPT

## 2022-10-09 PROCEDURE — S9083 URGENT CARE CENTER GLOBAL: HCPCS | Performed by: PHYSICIAN ASSISTANT

## 2022-10-09 PROCEDURE — G0382 LEV 3 HOSP TYPE B ED VISIT: HCPCS | Performed by: PHYSICIAN ASSISTANT

## 2022-10-09 NOTE — PROGRESS NOTES
NAME: Vinny Johnson is a 38 y o  male  : 1980    MRN: [id number]      Assessment and Plan   Acute cough [R05 1]  1  Acute cough  Poct Covid 19 Rapid Antigen Test      rapid COVID negative  Discussed likely viral   Continue over-the-counter medications, fluids and rest   If no improvement in another 4-5 days follow-up  He acknowledges      Patient Instructions     Patient Instructions   Continue OTC meds, fluids and rest  If no improvement in another 4-5 days f/u       Proceed to ER if symptoms worsen  Chief Complaint     Chief Complaint   Patient presents with   • Fatigue     Pt reports cold like symptoms since Thursday  Treated symptoms at home with Nyquil that did not provide much relief  Denies at home Covid testing  History of Present Illness   Patient with history of psychiatric disorder presents complaining of cold-like symptoms x4 days  Reports his whole household has had similar symptoms  Reports he wanted to make sure was not COVID  Reports some chills but no fevers  Denies any body aches, chest pain trouble breathing  Complains of cough, head and chest congestion and rhinorrhea  Has been taking over-the-counter medications with some relief  Reports he is feeling a lot better today but wanted to be sure  Review of Systems   Review of Systems   Constitutional: Positive for chills  Negative for fever  HENT: Positive for congestion and rhinorrhea  Negative for sore throat  Respiratory: Positive for cough and chest tightness  Negative for shortness of breath, wheezing and stridor  Cardiovascular: Negative for chest pain and palpitations  Gastrointestinal: Negative for diarrhea, nausea and vomiting  Musculoskeletal: Negative for myalgias  Neurological: Negative for dizziness and weakness           Current Medications       Current Outpatient Medications:   •  OLANZapine (ZyPREXA) 15 mg tablet, Take 15 mg by mouth daily, Disp: , Rfl: 0    Current Allergies Allergies as of 10/09/2022   • (No Known Allergies)              Past Medical History:   Diagnosis Date   • Lyme disease    • Patient denies medical problems 07/29/2019   • Schizophrenia ([facility] [address] ) 2008       Past Surgical History:   Procedure Laterality Date   • MOUTH SURGERY  1998       Family History   Problem Relation Age of Onset   • Diabetes Mother    • Diabetes Father          Medications have been verified  The following portions of the patient's history were reviewed and updated as appropriate: allergies, current medications, past family history, past medical history, past social history, past surgical history and problem list     Objective   Pulse 96   Temp (!) 97 °F (36 1 °C)   Resp 18   Ht 5' 6" (1 676 m)   Wt 81 6 kg (180 lb)   SpO2 98%   BMI 29 05 kg/m²      Physical Exam     Physical Exam  Vitals and nursing note reviewed  Constitutional:       General: He is not in acute distress  Appearance: Normal appearance  He is not ill-appearing, toxic-appearing or diaphoretic  HENT:      Head:      Comments: TMs mildly erythematous bilaterally without injection or bulging  Boggy nasal turbinates  Posterior oropharynx mildly erythematous without edema, tonsillar hypertrophy or exudates  Uvula midline  Soft palate equal   Cardiovascular:      Rate and Rhythm: Normal rate and regular rhythm  Heart sounds: Normal heart sounds  Pulmonary:      Effort: Pulmonary effort is normal  No respiratory distress  Breath sounds: Normal breath sounds  No stridor  No wheezing, rhonchi or rales  Musculoskeletal:      Cervical back: Normal range of motion  No rigidity  Lymphadenopathy:      Cervical: No cervical adenopathy  Skin:     Capillary Refill: Capillary refill takes less than 2 seconds  Neurological:      Mental Status: He is alert and oriented to person, place, and time

## 2022-10-25 PROBLEM — F20.0 PARANOID SCHIZOPHRENIA, CHRONIC CONDITION (HCC): Status: ACTIVE | Noted: 2022-10-25

## 2022-10-25 RX ORDER — OLANZAPINE 2.5 MG/1
2.5 TABLET ORAL
COMMUNITY
End: 2022-10-27 | Stop reason: SDUPTHER

## 2022-10-27 ENCOUNTER — TELEMEDICINE (OUTPATIENT)
Dept: PSYCHIATRY | Facility: CLINIC | Age: 42
End: 2022-10-27

## 2022-10-27 DIAGNOSIS — F20.0 PARANOID SCHIZOPHRENIA, CHRONIC CONDITION (HCC): Primary | ICD-10-CM

## 2022-10-27 RX ORDER — OLANZAPINE 15 MG/1
TABLET ORAL
Qty: 90 TABLET | Refills: 1 | Status: SHIPPED | OUTPATIENT
Start: 2022-10-27

## 2022-10-27 RX ORDER — OLANZAPINE 2.5 MG/1
TABLET ORAL
Qty: 30 TABLET | Refills: 4 | Status: SHIPPED | OUTPATIENT
Start: 2022-10-27

## 2022-10-27 NOTE — BH TREATMENT PLAN
TREATMENT PLAN (Medication Management Only)        [facility]    Name and Date of Birth:  Dennison Paget 38 y o  1980  Date of Treatment Plan: October 27, 2022  Diagnosis/Diagnoses:    1  Paranoid schizophrenia, chronic condition [facility])      Strengths/Personal Resources for Self-Care: supportive family, supportive friends  Area/Areas of need (in own words): hallucinations, paranoid thoughts  1  Long Term Goal: maintain psychotic symptoms  Target Date:6 months - 4/27/2023  Person/Persons responsible for completion of goal: Dave Leger  Short Term Objective (s) - How will we reach this goal?:   A  Provider new recommended medication/dosage changes and/or continue medication(s): continue current medications as prescribed Zyprexa  B  N/A   C  N/A  Target Date:6 months - 4/27/2023  Person/Persons Responsible for Completion of Goal: Michael Irby  Progress Towards Goals: stable  Treatment Modality: medication management every 4 months  Review due 180 days from date of this plan: 6 months - 4/27/2023  Expected length of service: maintenance  My Physician/PA/NP and I have developed this plan together and I agree to work on the goals and objectives  I understand the treatment goals that were developed for my treatment

## 2022-10-27 NOTE — PSYCH
Virtual Regular Visit    Verification of patient location:at home  Patient is located in the following state in which I hold an active license [place]      Assessment/Plan:       Diagnoses and all orders for this visit:    Paranoid schizophrenia, chronic condition ([facility] [address] )  -     OLANZapine (ZyPREXA) 2 5 mg tablet; Take 1 PO QD PRN  -     OLANZapine (ZyPREXA) 15 mg tablet; Take 1 PO Q HS    Other orders  -     Discontinue: OLANZapine (ZyPREXA) 2 5 mg tablet; Take 2 5 mg by mouth daily at bedtime Take 1 PO QD PRN          Goals addressed in session:   Good Health  Counseling provided:      Treatment Recommendations- Risks Benefits       Immediate Medical/Psychiatric/Psychotherapy Treatments and Any Precautions:     Risks, Benefits And Possible Side Effects Of Medications:  Risks, benefits, and possible side effects of medications explained to patient and patient verbalizes understanding    Controlled Medication Discussion: No records found for controlled prescriptions according to [address]      Reason for visit is No chief complaint on file  Medication Management     Encounter provider BOUBACAR Sarmiento    [place] [address] [place] [address]  [phone]      Recent Visits  No visits were found meeting these conditions  Showing recent visits within past 7 days and meeting all other requirements  Today's Visits  Date Type Provider Dept   10/27/22 Telemedicine Sally Leyva [facility] today's visits and meeting all other requirements  Future Appointments  No visits were found meeting these conditions  Showing future appointments within next 150 days and meeting all other requirements       The patient was identified by name and date of birth   Clayhole Pak was informed that this is a telemedicine visit and that the [facility] [employer] [facility]  He agrees to proceed     My office door was closed  No one else was in the room  He acknowledged consent and understanding of privacy and security of the video platform  The patient has agreed to participate and understands they can discontinue the visit at any time  Patient is aware this is a billable service  Elida Katz is a 38 y o  male    Here today for a med check   This  Was done via [employer]    normal appetite      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   Mood good  Does not feel he needs Zyprexa 2 5 mg   Denies Psychotic Symptoms  No Problems with Medication  Appetite Sleep good  Health OK  + exercise   Denies SI/HI    Past Medical History:   Diagnosis Date   • Lyme disease    • Patient denies medical problems 07/29/2019   • Schizophrenia ([facility] [address] ) 2008       Past Surgical History:   Procedure Laterality Date   • MOUTH SURGERY  1998       Current Outpatient Medications   Medication Sig Dispense Refill   • OLANZapine (ZyPREXA) 15 mg tablet Take 1 PO Q HS 90 tablet 1   • OLANZapine (ZyPREXA) 2 5 mg tablet Take 1 PO QD PRN 30 tablet 4     No current facility-administered medications for this visit          No Known Allergies    Social History     Substance and Sexual Activity   Drug Use Never       Family History   Problem Relation Age of Onset   • Diabetes Mother    • Diabetes Father            Objective    Mental status:  Appearance calm and cooperative  and adequate hygiene and grooming   Mood mood appropriate   Affect affect appropriate    Speech a normal rate and fluent   Thought Processes normal thought processes   Hallucinations no hallucinations present    Thought Content no delusions   Abnormal Thoughts no suicidal thoughts  and no homicidal thoughts    Orientation  oriented to person and place and time   Remote Memory short term memory intact and long term memory intact   Attention Span concentration intact   Intellect Appears to be of Average Intelligence   Insight Insight intact   Judgement judgment was intact   Muscle Strength Muscle strength and tone were normal and Normal gait    Language no difficulty naming common objects   Fund of Knowledge displays adequate knowledge of current events   Pain none   Pain Scale 0       Video Exam    There were no vitals filed for this visit      I spent 20 minutes directly with the patient during this visit    Patient Instructions   Continue Current Tx  Report Problems  Use Zyprexa 2 5 mg as a PRN Only  Work  Return 4 months       Visit Time    Visit Start Time: 3454  Visit Stop Time: 1630  Total Visit Duration: 23 min

## 2023-02-16 ENCOUNTER — TELEMEDICINE (OUTPATIENT)
Dept: PSYCHIATRY | Facility: CLINIC | Age: 43
End: 2023-02-16

## 2023-02-16 DIAGNOSIS — F20.0 PARANOID SCHIZOPHRENIA, CHRONIC CONDITION (HCC): Primary | ICD-10-CM

## 2023-02-16 RX ORDER — OLANZAPINE 15 MG/1
TABLET ORAL
Qty: 30 TABLET | Refills: 5 | Status: SHIPPED | OUTPATIENT
Start: 2023-02-16

## 2023-02-16 RX ORDER — OLANZAPINE 2.5 MG/1
TABLET ORAL
Qty: 30 TABLET | Refills: 5 | Status: SHIPPED | OUTPATIENT
Start: 2023-02-16

## 2023-02-16 NOTE — BH TREATMENT PLAN
TREATMENT PLAN (Medication Management Only)        [facility]    Name and Date of Birth:  Roberto Holm 38 y o  1980  Date of Treatment Plan: February 16, 2023  Diagnosis/Diagnoses:    1  Paranoid schizophrenia, chronic condition [facility])      Strengths/Personal Resources for Self-Care: supportive family, supportive friends, taking medications as prescribed  Area/Areas of need (in own words): hallucinations, paranoid thoughts  1  Long Term Goal: maintain psychotic symptoms  Target Date:6 months - 8/16/2023  Person/Persons responsible for completion of goal: Dave Leger  Short Term Objective (s) - How will we reach this goal?:   A  Provider new recommended medication/dosage changes and/or continue medication(s): continue current medications as prescribed Zyprexa  B  N/A   C  N/A  Target Date:6 months - 8/16/2023  Person/Persons Responsible for Completion of Goal: Conner Polo  Progress Towards Goals: stable  Treatment Modality: medication management every 5 months  Review due 180 days from date of this plan: 6 months - 8/16/2023  Expected length of service: maintenance  My Physician/PA/NP and I have developed this plan together and I agree to work on the goals and objectives  I understand the treatment goals that were developed for my treatment

## 2023-02-16 NOTE — PSYCH
Virtual Regular Visit    Verification of patient location: at home    Patient is located in the following state in which I hold an active license [place]      Assessment/Plan:       Diagnoses and all orders for this visit:    Paranoid schizophrenia, chronic condition ([facility] [address] )  -     OLANZapine (ZyPREXA) 2 5 mg tablet; Take 1 PO QD PRN  -     OLANZapine (ZyPREXA) 15 mg tablet; Take 1 PO Q HS          Goals addressed in session: Good Health  Counseling provided:      Treatment Recommendations- Risks Benefits       Immediate Medical/Psychiatric/Psychotherapy Treatments and Any Precautions:     Risks, Benefits And Possible Side Effects Of Medications:  Risks, benefits, and possible side effects of medications explained to patient and patient verbalizes understanding    Controlled Medication Discussion: No records found for controlled prescriptions according to [facility] [address]      Reason for visit is No chief complaint on file  Medication Management    Encounter provider BOUBACAR Valerio    Provider [place] [address] [place] [address]  [phone]      Recent Visits  No visits were found meeting these conditions  Showing recent visits within past 7 days and meeting all other requirements  Today's Visits  Date Type Provider Dept   02/16/23 Telemedicine Sandhya [facility] today's visits and meeting all other requirements  Future Appointments  No visits were found meeting these conditions  Showing future appointments within next 150 days and meeting all other requirements       The patient was identified by name and date of birth  Gregoria Hogan was informed that this is a telemedicine visit and that [facility]  He agrees to proceed     My office door was closed  No one else was in the room    He acknowledged consent and understanding of privacy and security of the video platform  The patient has agreed to participate and understands they can discontinue the visit at any time  Patient is aware this is a billable service  Subjective    Orin Ordaz is a 38 y o  male    Here today for a med check  This was via [employer]    normal appetite      HPI Mood good  Feels the meds have a + effect  Anxiety manageable   Denies Psychotic Symptoms  "A little paranoia but nothing out of the ordinary"  No problems with medication  Appetite Sleep good  Health OK  Denies SI/HI    Past Medical History:   Diagnosis Date   • Lyme disease    • Patient denies medical problems 07/29/2019   • Schizophrenia ([facility] [address] ) 2008       Past Surgical History:   Procedure Laterality Date   • MOUTH SURGERY  1998       Current Outpatient Medications   Medication Sig Dispense Refill   • OLANZapine (ZyPREXA) 15 mg tablet Take 1 PO Q HS 30 tablet 5   • OLANZapine (ZyPREXA) 2 5 mg tablet Take 1 PO QD PRN 30 tablet 5     No current facility-administered medications for this visit          No Known Allergies    Social History     Substance and Sexual Activity   Drug Use Never       Family History   Problem Relation Age of Onset   • Diabetes Mother    • Diabetes Father            Objective    Mental status:  Appearance calm and cooperative , adequate hygiene and grooming and good eye contact    Mood mood appropriate   Affect affect appropriate    Speech a normal rate and fluent   Thought Processes normal thought processes   Hallucinations no hallucinations present    Thought Content no delusions   Abnormal Thoughts no suicidal thoughts  and no homicidal thoughts    Orientation  oriented to place and oriented to time   Remote Memory short term memory intact and long term memory intact   Attention Span concentration intact   Intellect Appears to be of Average Intelligence   Insight Insight intact   Judgement judgment was intact   Muscle Strength Muscle strength and tone were normal and Normal gait    Language no difficulty naming common objects   Fund of Knowledge displays adequate knowledge of current events   Pain none   Pain Scale 0       Video Exam    There were no vitals filed for this visit      I spent 15 minutes directly with the patient during this visit    Patient Instructions   Continue Current Tx  Report Problems  Return 5 months       Visit Time    Visit Start Time: 4444  Visit Stop Time: 2684  Total Visit Duration: 17 min

## 2023-06-16 ENCOUNTER — DOCUMENTATION (OUTPATIENT)
Dept: PSYCHIATRY | Facility: CLINIC | Age: 43
End: 2023-06-16

## 2023-06-16 NOTE — PSYCH
[facility]    Patient Name Jania Cuevas     Date of Birth: 41 y o  1980      MRN: [id number]    Admission Date: several years ago    Date of Transfer: June 16, 2023    Admission Diagnosis:     Schizophrenia    Current Diagnosis:     No diagnosis found  Reason for Admission: Tino Castlilo presented for treatment due to psychotic symptoms  Primary complaints included PSYCHOTIC SYMPTOMS: unremarkable  Progress in Treatment: Tino Castillo was seen for Medication Management  During the course of treatment he states doing well  Feels meds have a + effect  Mood good  Denies Psychotic Symptoms  Episodes of Higher Level of Care: No    Transfer request Initiated by: Psychiatrist: Nurse Practitioner Nicole Donis Therapist: None    Reason for Transfer Request: clinician leaving practice    Does this individual need a clinician with specialized training/expertise?: No    Is this client working with any other \[facility]\"" Providers/Therapists?  Psychiatrist: None Therapist: None    Other pertinent issues: None    Are there any specific individuals who would be a “best fit” or who have already agreed to accept this transfer request?      Psychiatrist: None   Therapist: None  Rationale: Not Applicable    Attempts to maintain the current therapeutic relationship: Not Applicable    Transfer request routed to Clinical Coordinator for input and/or approval      Comments from other involved providers and/or clinical coordinator: None    Nicole Donis, RICHARDNP06/16/23

## 2023-06-22 ENCOUNTER — TELEPHONE (OUTPATIENT)
Dept: PSYCHIATRY | Facility: CLINIC | Age: 43
End: 2023-06-22

## 2023-06-22 NOTE — TELEPHONE ENCOUNTER
Contacted client about cancelling 7/13/2023 appt  with BOUBACAR Strange due to him retiring  We are in the process of hiring new providers within our practice  Once we have their schedules, we will contact you to reschedule your appt  Please call [phone] to request refills as needed

## 2023-08-31 ENCOUNTER — TELEPHONE (OUTPATIENT)
Dept: PSYCHIATRY | Facility: CLINIC | Age: 43
End: 2023-08-31

## 2023-09-03 DIAGNOSIS — F20.0 PARANOID SCHIZOPHRENIA, CHRONIC CONDITION (HCC): ICD-10-CM

## 2023-10-13 ENCOUNTER — TELEMEDICINE (OUTPATIENT)
Dept: PSYCHIATRY | Facility: CLINIC | Age: 43
End: 2023-10-13
Payer: COMMERCIAL

## 2023-10-13 DIAGNOSIS — F20.0 PARANOID SCHIZOPHRENIA, CHRONIC CONDITION (HCC): ICD-10-CM

## 2023-10-13 PROCEDURE — 99214 OFFICE O/P EST MOD 30 MIN: CPT | Performed by: PSYCHIATRY & NEUROLOGY

## 2023-10-13 RX ORDER — OLANZAPINE 10 MG/1
TABLET ORAL
Qty: 45 TABLET | Refills: 2 | Status: SHIPPED | OUTPATIENT
Start: 2023-10-13

## 2023-10-13 NOTE — PSYCH
This was a Telepsychiatry visit that took place through [address] [place] [address]. The patient's identity was verified via name and date of birth. Patient expressed understanding, and voluntarily agreed to proceed with the visit. This visit occurred at the address listed in the patient's chart as the patient's residence. Chief Complaint:    Psychosis    Interval Summary    Pt said that he is doing good since his last visit. He said that there are days when he feels like he has no energy in the mornings and feels tired about 2-3 times a week, especially in the mornings and goes away by afternoon. He denied feeling depressed in the last few months. He said that he feels anxious, mostly situational and able to cope with it. 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 He used to a lot of sports photography in the past and now he does wedding photography and real estate photography. He lives with his parents and it is going well. He denied nay current stressors. He exercises regularly. Psychiatric ROS    Sleep: Good  Appetite: Good  Suicidal ideation: Denied  Homicidal ideation: Denied  Psychosis: Hears voices 2 time a week, less intense, short lived, and able to cope better with them, he thinks these may be his thoughts he is hearing out loud as well. He feels paranoid 2 times a week, but able to distract himself, able to differentiate this from reality, and murray well.   Makayla: None reported  PTSD: None reported  Panic attacks: None reported  Memory issues: None reported  Side effects: Tired a few times a week    D+A Use    Alcohol: Drinks 1 - 2 beers over the weekend  Nicotine: None  Other substances: None    Medical Issues  Acute medical issues at present: None  Last PCP Visit: 1 year ago      Medical Review of Systems    No fever/dizziness/blurred vision/cough/chest pain/shortness of breath/abdominal pain/nausea/vomiting/bladder or bowel problems/headache/weakness/rigidity or pain reported. Any symptoms reported by patient are listed in the interval summary. Mental Status Exam    Motor: Normal gait and no abnormal movements  General appearance and behavior: Moderately kempt, calm, cooperative, pleasant, good eye contact, good rapport, no psychomotor abnormalities noted  Speech: Spontaneous with normal rate, normal volume and normal tone  Mood: "Good"  Affect: Euthymic, congruent with mood, normal range, appropriate  Thought Process: linear  Thought content: Denied suicidal or homicidal ideation. No delusions elicited  Perception: Denied any auditory or visual hallucinations  Insight: Good  Judgment: Good  Orientation: Oriented to person, place and time  Attention/Concentration: Good  Memory - Grossly intact  Language - [address] of Knowledge - Adequate    Assessment and Plan    Paranoid schizophrenia, chronic - Continue olanzapine 15 mg Po HS. Pt can try taking 10 mg pill at night. To monitor closely how he does with his psychosis. Take extra 5 mg pill if he feels his psychosis is worsening. D/c olanzapine 2.5 mg PRN voices/paranoia     The clinical diagnosis, course and prognosis were explained to the patient. Discussed with patient the clinical indications, interactions, benefits, the most common and serious side effects of all current medications. The alternative treatment options were discussed. The importance of continuing in psychotherapy was reinstated. The patient was receptive and appeared to understand the information provided. Patient's concerns and questions were addressed to patient's satisfaction during the appointment, and the patient agreed to the treatment plan. Patient is aware of adverse effects of use of alcohol and/or other substances on mental illness and is aware of the risk of combining alcohol and/or substances with the medications that are currently prescribed.  The patient was advised go to the nearest emergency room or call 911 if new symptoms arise or existing symptoms worsen or has thoughts of hurting self or others.     Follow up in 4 weeks    Start time - 520 pm  End time - 550 pm

## 2023-10-13 NOTE — BH TREATMENT PLAN
TREATMENT PLAN (Medication Management Only)        [facility]    Name and Date of Birth:  Jeannine Olson 41 y.o. 1980  Date of Treatment Plan: October 13, 2023  Diagnosis/Diagnoses:    1. Paranoid schizophrenia, chronic condition [facility])        Strengths/Personal Resources for Self-Care: Works full time, supportive family    Area/Areas of need (in own words):    1. Long Term Goal: Management of symptoms  Target Date: 6 months  Person/Persons responsible for completion of goal: Patient    2. Short Term Objective(s): How will we reach this goal - Continue medications as prescribed  Target Date: 6 months  Person/Persons Responsible for Completion of Goal: Patient    Progress Towards Goals: Stable    Treatment Modality: Medication management every two months    Review due in 180 days from the date of this plan    Expected length of service: Follow up appointment every 1-2 months for maintenance    My Physician/PA/NP and I have developed this plan together and I agree to work on the goals and objectives. I understand the treatment goals that were developed for my treatment. I consent to the above treatment plan.

## 2023-11-01 DIAGNOSIS — F20.0 PARANOID SCHIZOPHRENIA, CHRONIC CONDITION (HCC): Primary | ICD-10-CM

## 2023-11-01 RX ORDER — OLANZAPINE 5 MG/1
5 TABLET ORAL EVERY MORNING
Qty: 30 TABLET | Refills: 2 | Status: SHIPPED | OUTPATIENT
Start: 2023-11-01

## 2023-11-01 RX ORDER — OLANZAPINE 10 MG/1
10 TABLET ORAL
Qty: 30 TABLET | Refills: 2 | Status: SHIPPED | OUTPATIENT
Start: 2023-11-01

## 2023-11-01 NOTE — TELEPHONE ENCOUNTER
Pts [facility] closed and script was supposed to transfer to [facility] but [facility] did not get that script. They need a script sent to them. Additionally, the script needs to be slit up into a 5mg and 10mg because INS will not pay for 1.5 tabs daily.  Sending scripts for approval.

## 2023-11-07 ENCOUNTER — TELEMEDICINE (OUTPATIENT)
Dept: PSYCHIATRY | Facility: CLINIC | Age: 43
End: 2023-11-07
Payer: COMMERCIAL

## 2023-11-07 DIAGNOSIS — F20.0 PARANOID SCHIZOPHRENIA, CHRONIC CONDITION (HCC): Primary | ICD-10-CM

## 2023-11-07 PROCEDURE — 99213 OFFICE O/P EST LOW 20 MIN: CPT | Performed by: PSYCHIATRY & NEUROLOGY

## 2023-11-07 NOTE — PSYCH
After connecting through [facility], patient was verified with two unique identifiers. Patient (or authorized legal representative) was then informed that this was a Telemedicine visit and that the exam was being conducted confidentially over secure lines. My office door was closed. No one else was in the room with me. Patient acknowledged consent and understanding of privacy and security of the Telemedicine visit, and gave permission to have a telemedicine presenter stay in the room in order to assist with the history and to conduct the exam as needed. I informed the patient that I have reviewed their record in Epic and presented the opportunity for them to ask any questions regarding the visit today. The patient agreed to participate. This was a Telepsychiatry visit that took place through [address] [place] [address]. The patient's identity was verified via name and date of birth. Patient expressed understanding, and voluntarily agreed to proceed with the visit. This visit occurred at the address listed in the patient's chart as the patient's residence. Chief Complaint:    Psychosis    Interval Summary    Pt said that he is doing better in the last few days. He said that he is taking 10 mg olanzapine at nights and he is doing well. He has not needed to take the 5 mg during the day for his voices. He said his insurance gave him 10 mg and 5 mg pills. He denied feeling depressed. He feels anxious at times, on and off, situational and coping well. He denied hearing nay voices and paranoia for the last few weeks. He has mild voices at times, but able to cope well with them and they are not affecting him. He denied any current stressors and he is busy with work and getting ready for holidays.     Psychiatric ROS    Sleep: Good  Appetite: Good  Suicidal ideation: Denied  Homicidal ideation: Denied  Psychosis: Hears voices 2 time a week, less intense, short lived, and able to cope better with them, he thinks these may be his thoughts he is hearing out loud as well. He feels paranoid 2 times a week, but able to distract himself, able to differentiate this from reality, and murray well. Makayla: None reported  PTSD: None reported  Panic attacks: None reported  Memory issues: None reported  Side effects: None reported, feels less tired with 10 mg    D+A Use    Alcohol: Drinks 1 - 2 beers over the weekend  Nicotine: None  Other substances: None    Medical Issues  Acute medical issues at present: None  Last PCP Visit: 1 year ago, will make an appt    Medical Review of Systems    No fever/dizziness/blurred vision/cough/chest pain/shortness of breath/abdominal pain/nausea/vomiting/bladder or bowel problems/headache/weakness/rigidity or pain reported. Any symptoms reported by patient are listed in the interval summary. Mental Status Exam    Motor: Normal gait and no abnormal movements  General appearance and behavior: Moderately kempt, calm, cooperative, pleasant, good eye contact, good rapport, no psychomotor abnormalities noted  Speech: Spontaneous with normal rate, normal volume and normal tone  Mood: "Good"  Affect: Euthymic, congruent with mood, normal range, appropriate  Thought Process: linear  Thought content: Denied suicidal or homicidal ideation. No delusions elicited  Perception: Denied any auditory or visual hallucinations  Insight: Good  Judgment: Good  Orientation: Oriented to person, place and time  Attention/Concentration: Good  Memory - Grossly intact  Language - [address] of Knowledge - Adequate    Assessment and Plan    Paranoid schizophrenia, chronic - Continue olanzapine 10 mg Po HS and 5 mg as needed in the am.    Pt can try taking 10 mg pill at night. To monitor closely how he does with his psychosis. Take extra 5 mg pill if he feels his psychosis is worsening. The clinical diagnosis, course and prognosis were explained to the patient.  Discussed with patient the clinical indications, interactions, benefits, the most common and serious side effects of all current medications. The alternative treatment options were discussed. The importance of continuing in psychotherapy was reinstated. The patient was receptive and appeared to understand the information provided. Patient's concerns and questions were addressed to patient's satisfaction during the appointment, and the patient agreed to the treatment plan. Patient is aware of adverse effects of use of alcohol and/or other substances on mental illness and is aware of the risk of combining alcohol and/or substances with the medications that are currently prescribed. The patient was advised go to the nearest emergency room or call 911 if new symptoms arise or existing symptoms worsen or has thoughts of hurting self or others. Behavioral Health OP Treatment Plan      Name and Date of Birth:  Suzy Degroot 41 y.o. 1980    Date of Treatment Plan: November 7, 2023    Diagnosis/Diagnoses:     1. Paranoid schizophrenia, chronic condition [facility])          Strengths/Personal Resources for Self-Care: Good insight, compliant with treatment, good family support    Area/Areas of need (in own words):    1. Long Term Goal: Management of symptoms  Target Date: 6 months  Person/Persons responsible for completion of goal: Patient    2. Short Term Objective(s): How will we reach this goal - Continue medications as prescribed  Target Date: 6 months  Person/Persons Responsible for Completion of Goal: Patient    Progress Towards Goals: Stable    Treatment Modality: Medication management every two months    Review due in 180 days from the date of this plan    Expected length of service: Follow up appointment every 1-2 months for maintenance    My Physician/PA/NP and I have developed this plan together and I agree to work on the goals and objectives. I understand the treatment goals that were developed for my treatment. I consent to the above treatment plan.     Follow up in 4 weeks    Start time: 525 PM    Stop time: 538 PM

## 2023-12-02 RX ORDER — OLANZAPINE 2.5 MG/1
TABLET, FILM COATED ORAL
Qty: 30 TABLET | Refills: 5 | OUTPATIENT
Start: 2023-12-02

## 2023-12-23 DIAGNOSIS — F20.0 PARANOID SCHIZOPHRENIA, CHRONIC CONDITION (HCC): ICD-10-CM

## 2024-01-02 RX ORDER — OLANZAPINE 5 MG/1
5 TABLET ORAL EVERY MORNING
Qty: 90 TABLET | Refills: 1 | Status: SHIPPED | OUTPATIENT
Start: 2024-01-02 | End: 2024-01-04 | Stop reason: DRUGHIGH

## 2024-01-04 ENCOUNTER — TELEMEDICINE (OUTPATIENT)
Dept: PSYCHIATRY | Facility: CLINIC | Age: 44
End: 2024-01-04
Payer: COMMERCIAL

## 2024-01-04 DIAGNOSIS — F20.0 PARANOID SCHIZOPHRENIA, CHRONIC CONDITION (HCC): Primary | ICD-10-CM

## 2024-01-04 PROCEDURE — 99213 OFFICE O/P EST LOW 20 MIN: CPT | Performed by: PSYCHIATRY & NEUROLOGY

## 2024-01-04 RX ORDER — OLANZAPINE 10 MG/1
10 TABLET ORAL
Qty: 30 TABLET | Refills: 2 | Status: SHIPPED | OUTPATIENT
Start: 2024-01-04

## 2024-01-04 NOTE — PSYCH
After connecting through [employer], patient was verified with two unique identifiers.  Patient (or authorized legal representative) was then informed that this was a Telemedicine visit and that the exam was being conducted confidentially over secure lines. My office door was closed.  No one else was in the room with me.  Patient acknowledged consent and understanding of privacy and security of the Telemedicine visit, and gave permission to have a telemedicine presenter stay in the room in order to assist with the history and to conduct the exam as needed.  I informed the patient that I have reviewed their record in Epic and presented the opportunity for them to ask any questions regarding the visit today.  The patient agreed to participate.      Chief Complaint:    Psychosis    Interval Summary    Pt said that he is doing great since his last visit. He said he had good time during the holidays. He said that 10 mg olanzapine is working well for him. He does not feel tired during the day and seems to have better energy levels. He said his voices have been minimal and dealing much better with it. He denied any depression or anxiety. He is able patrick function well at work and at home. He denied nay current stressors. 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      Psychiatric ROS    Sleep: Good  Appetite: Good  Suicidal ideation: Denied  Homicidal ideation: Denied  Psychosis: Minimal voices, 1-2 times a week, able to cope well with it and able to ignore them, no paranoia  Makayla: None reported  PTSD: None reported  Panic attacks: None reported  Memory issues: None reported  Side effects: None reported\    D+A Use    Alcohol: Drinks 1 - 2 beers over the weekend  Nicotine: None  Other substances: None    Medical Issues  Acute medical issues at present: None  Last PCP Visit: 1 year ago, will make an appt    Medical Review of Systems    No fever/dizziness/blurred vision/cough/chest pain/shortness of breath/abdominal  "pain/nausea/vomiting/bladder or bowel problems/headache/weakness/rigidity or pain reported. Any symptoms reported by patient are listed in the interval summary.    Mental Status Exam    Motor: Normal gait and no abnormal movements  General appearance and behavior: Moderately kempt, calm, cooperative, pleasant, good eye contact, good rapport, no psychomotor abnormalities noted  Speech: Spontaneous with normal rate, normal volume and normal tone  Mood: \"Good\"  Affect: Euthymic, congruent with mood, normal range, appropriate  Thought Process: linear  Thought content: Denied suicidal or homicidal ideation. No delusions elicited  Perception: Denied any auditory or visual hallucinations  Insight: Good  Judgment: Good  Orientation: Oriented to person, place and time  Attention/Concentration: Good  Memory - Grossly intact  Language - Fluent  Fund of Knowledge - Adequate    Assessment and Plan    Paranoid schizophrenia, chronic - Continue olanzapine 10 mg Po HS and 5 mg as needed in the am.    Pt can try taking 10 mg pill at night. To monitor closely how he does with his psychosis. Take extra 5 mg pill if he feels his psychosis is worsening.    Advised to get CBC with diff, CMP, TSH, Lipid profile. He wants to get it done at the same time he gets physical from his PCP    The clinical diagnosis, course and prognosis were explained to the patient. Discussed with patient the clinical indications, interactions, benefits, the most common and serious side effects of all current medications. The alternative treatment options were discussed. The importance of continuing in psychotherapy was reinstated. The patient was receptive and appeared to understand the information provided. Patient's concerns and questions were addressed to patient's satisfaction during the appointment, and the patient agreed to the treatment plan.    Patient is aware of adverse effects of use of alcohol and/or other substances on mental illness and is aware " of the risk of combining alcohol and/or substances with the medications that are currently prescribed. The patient was advised go to the nearest emergency room or call 911 if new symptoms arise or existing symptoms worsen or has thoughts of hurting self or others.      Behavioral Health OP Treatment Plan      Name and Date of Birth:  Dave Kendall 43 y.o. 1980    Date of Treatment Plan: January 4, 2024    Diagnosis/Diagnoses:     1. Paranoid schizophrenia, chronic condition (HCC)          Strengths/Personal Resources for Self-Care: Good insight, compliant with treatment, good family support    Area/Areas of need (in own words):    1. Long Term Goal: Management of symptoms  Target Date: 6 months  Person/Persons responsible for completion of goal: Patient    2.  Short Term Objective(s):  How will we reach this goal - Continue medications as prescribed  Target Date: 6 months  Person/Persons Responsible for Completion of Goal: Patient    Progress Towards Goals: Stable    Treatment Modality: Medication management every two months    Review due in 180 days from the date of this plan    Expected length of service: Follow up appointment every 1-2 months for maintenance    My Physician/PA/NP and I have developed this plan together and I agree to work on the goals and objectives. I understand the treatment goals that were developed for my treatment. I consent to the above treatment plan.    Follow up in 4 weeks    Start time: 520 PM    Stop time: 538 PM

## 2024-03-19 ENCOUNTER — TELEMEDICINE (OUTPATIENT)
Dept: PSYCHIATRY | Facility: CLINIC | Age: 44
End: 2024-03-19
Payer: COMMERCIAL

## 2024-03-19 DIAGNOSIS — F20.0 PARANOID SCHIZOPHRENIA, CHRONIC CONDITION (HCC): Primary | ICD-10-CM

## 2024-03-19 PROCEDURE — 99213 OFFICE O/P EST LOW 20 MIN: CPT | Performed by: PSYCHIATRY & NEUROLOGY

## 2024-03-19 RX ORDER — OLANZAPINE 10 MG/1
10 TABLET ORAL
Qty: 30 TABLET | Refills: 2 | Status: SHIPPED | OUTPATIENT
Start: 2024-03-19

## 2024-03-19 NOTE — PSYCH
After connecting through [employer], patient was verified with two unique identifiers.  Patient (or authorized legal representative) was then informed that this was a Telemedicine visit and that the exam was being conducted confidentially over secure lines. My office door was closed.  No one else was in the room with me.  Patient acknowledged consent and understanding of privacy and security of the Telemedicine visit, and gave permission to have a telemedicine presenter stay in the room in order to assist with the history and to conduct the exam as needed.  I informed the patient that I have reviewed their record in Epic and presented the opportunity for them to ask any questions regarding the visit today.  The patient agreed to participate.      Chief Complaint:    Psychosis    Interval Summary    Pt said that he is doing good since his last visit. He denied feeling depressed in the last few weeks. He said he feels anxious at times, mostly situational and is able to cope well with it. He said that he is doing with 10 mg olanzapine at night and does not take 5 mg in the morning., he said that eh is doing well and denied any worsening of his paranoia or voices. He is doing well at work and at home. He denied any other current stressors.    Psychiatric ROS    Sleep: Good, 7-8 hours, feels rested  Appetite: Good  Suicidal ideation: Denied  Homicidal ideation: Denied  Psychosis: Minimal voices, 1-2 times a week, able to cope well with it and able to ignore them, some paranoia, coping well  Makayla: None reported  PTSD: None reported  Panic attacks: None reported  Memory issues: None reported  Side effects: None reported\    D+A Use    Alcohol: Drinks 1 - 2 beers over the weekend  Nicotine: None  Other substances: None    Medical Issues  Acute medical issues at present: None  Last PCP Visit: 1 year ago, will make an appt    Medical Review of Systems    No fever/dizziness/blurred vision/cough/chest pain/shortness of  "breath/abdominal pain/nausea/vomiting/bladder or bowel problems/headache/weakness/rigidity or pain reported. Any symptoms reported by patient are listed in the interval summary.    Mental Status Exam    Motor: Normal gait and no abnormal movements  General appearance and behavior: Moderately kempt, calm, cooperative, pleasant, good eye contact, good rapport, no psychomotor abnormalities noted  Speech: Spontaneous with normal rate, normal volume and normal tone  Mood: \"Good\"  Affect: Euthymic, congruent with mood, normal range, appropriate  Thought Process: linear  Thought content: Denied suicidal or homicidal ideation. No delusions elicited  Perception: Denied any auditory or visual hallucinations  Insight: Good  Judgment: Good  Orientation: Oriented to person, place and time  Attention/Concentration: Good  Memory - Grossly intact  Language - Fluent  Fund of Knowledge - Adequate    Assessment and Plan    Paranoid schizophrenia, chronic - Continue olanzapine 10 mg Po HS      CBC with diff, CMP, TSH, Lipid profile - [facility] as pt is on olanzapine for metabolic syndrome monitoring    The clinical diagnosis, course and prognosis were explained to the patient. Discussed with patient the clinical indications, interactions, benefits, the most common and serious side effects of all current medications. The alternative treatment options were discussed. The importance of continuing in psychotherapy was reinstated. The patient was receptive and appeared to understand the information provided. Patient's concerns and questions were addressed to patient's satisfaction during the appointment, and the patient agreed to the treatment plan.    Patient is aware of adverse effects of use of alcohol and/or other substances on mental illness and is aware of the risk of combining alcohol and/or substances with the medications that are currently prescribed. The patient was advised go to the nearest emergency room or call 911 " if new symptoms arise or existing symptoms worsen or has thoughts of hurting self or others.      Behavioral Health OP Treatment Plan      Name and Date of Birth:  Dave Kendall 43 y.o. 1980    Date of Treatment Plan: March 19, 2024    Diagnosis/Diagnoses:     1. Paranoid schizophrenia, chronic condition (HCC)          Strengths/Personal Resources for Self-Care: Good insight, compliant with treatment, good family support    Area/Areas of need (in own words):    1. Long Term Goal: Management of symptoms  Target Date: 6 months  Person/Persons responsible for completion of goal: Patient    2.  Short Term Objective(s):  How will we reach this goal - Continue medications as prescribed  Target Date: 6 months  Person/Persons Responsible for Completion of Goal: Patient    Progress Towards Goals: Stable    Treatment Modality: Medication management every two months    Review due in 180 days from the date of this plan    Expected length of service: Follow up appointment every 1-2 months for maintenance    My Physician/PA/NP and I have developed this plan together and I agree to work on the goals and objectives. I understand the treatment goals that were developed for my treatment. I consent to the above treatment plan.    Follow up in 8 weeks    Start time: 506 PM    Stop time: 516 PM

## 2024-05-03 DIAGNOSIS — F20.0 PARANOID SCHIZOPHRENIA, CHRONIC CONDITION (HCC): ICD-10-CM

## 2024-05-03 RX ORDER — OLANZAPINE 10 MG/1
10 TABLET ORAL
Qty: 90 TABLET | Refills: 1 | Status: SHIPPED | OUTPATIENT
Start: 2024-05-03

## 2024-05-14 ENCOUNTER — TELEMEDICINE (OUTPATIENT)
Dept: PSYCHIATRY | Facility: CLINIC | Age: 44
End: 2024-05-14
Payer: COMMERCIAL

## 2024-05-14 DIAGNOSIS — F20.0 PARANOID SCHIZOPHRENIA, CHRONIC CONDITION (HCC): Primary | ICD-10-CM

## 2024-05-14 PROCEDURE — 99213 OFFICE O/P EST LOW 20 MIN: CPT | Performed by: PSYCHIATRY & NEUROLOGY

## 2024-05-14 RX ORDER — OLANZAPINE 10 MG/1
10 TABLET ORAL
Qty: 30 TABLET | Refills: 3 | Status: SHIPPED | OUTPATIENT
Start: 2024-05-14

## 2024-05-14 NOTE — PSYCH
After connecting through [employer], patient was verified with two unique identifiers. Patient confirmed that they were at Home/Office and were in [place] at the time of the appointment.    Patient (or authorized legal representative) was then informed that this was a Telemedicine visit and that the exam was being conducted confidentially over secure lines. My office door was closed.  No one else was in the room with me.  Patient acknowledged consent and understanding of privacy and security of the Telemedicine visit, and gave permission to have a telemedicine presenter stay in the room in order to assist with the history and to conduct the exam as needed.  I informed the patient that I have reviewed their record in Epic and presented the opportunity for them to ask any questions regarding the visit today.  The patient agreed to participate.    Chief Complaint:    Psychosis    Interval Summary    Pt said that he is doing good since his last visit. He said that he was unable to get his blood tests done at [facility] and will get it done at [facility] this week. He denied feeling depressed in the last few months. He said he is anxious, on and off, mostly in the afternoons, may be situational and at times with no specific triggers. He is coping much better with his mood. He denied any current stressors and his work hours are better at work.    Psychiatric ROS    Sleep: Good, 7-8 hours, feels rested  Appetite: Good  Suicidal ideation: Denied  Homicidal ideation: Denied  Psychosis: Minimal voices, 1-2 times a week, mostly early evenings, able to cope well with it and able to ignore them, some paranoia, coping well  Makayla: None reported  PTSD: None reported  Panic attacks: None reported  Memory issues: None reported  Side effects: None reported    D+A Use    Alcohol: Drinks 1 - 2 beers over the weekend  Nicotine: None  Other substances: None    Medical Issues  Acute medical issues at present: None  Last PCP  "Visit: 1 year ago, will make an appt    Medical Review of Systems    No fever/dizziness/blurred vision/cough/chest pain/shortness of breath/abdominal pain/nausea/vomiting/bladder or bowel problems/headache/weakness/rigidity or pain reported. Any symptoms reported by patient are listed in the interval summary.    Mental Status Exam    Motor: Normal gait and no abnormal movements  General appearance and behavior: Moderately kempt, calm, cooperative, pleasant, good eye contact, good rapport, no psychomotor abnormalities noted  Speech: Spontaneous with normal rate, normal volume and normal tone  Mood: \"Good\"  Affect: Euthymic, congruent with mood, normal range, appropriate  Thought Process: linear  Thought content: Denied suicidal or homicidal ideation. No delusions elicited  Perception: Denied any auditory or visual hallucinations  Insight: Good  Judgment: Good  Orientation: Oriented to person, place and time  Attention/Concentration: Good  Memory - Grossly intact  Language - Fluent  Fund of Knowledge - Adequate    Assessment and Plan    Paranoid schizophrenia, chronic - Continue olanzapine 10 mg Po HS    CBC with diff, CMP, TSH, Lipid profile - [facility] as pt is on olanzapine for metabolic syndrome monitoring    The clinical diagnosis, course and prognosis were explained to the patient. Discussed with patient the clinical indications, interactions, benefits, the most common and serious side effects of all current medications. The alternative treatment options were discussed. The importance of continuing in psychotherapy was reinstated. The patient was receptive and appeared to understand the information provided. Patient's concerns and questions were addressed to patient's satisfaction during the appointment, and the patient agreed to the treatment plan.    Patient is aware of adverse effects of use of alcohol and/or other substances on mental illness and is aware of the risk of combining alcohol and/or " substances with the medications that are currently prescribed. The patient was advised go to the nearest emergency room or call 911 if new symptoms arise or existing symptoms worsen or has thoughts of hurting self or others.      Behavioral Health OP Treatment Plan      Name and Date of Birth:  Dave Kendall 44 y.o. 1980    Date of Treatment Plan: May 14, 2024    Diagnosis/Diagnoses:     1. Paranoid schizophrenia, chronic condition (HCC)          Strengths/Personal Resources for Self-Care: Good insight, compliant with treatment, good family support    Area/Areas of need (in own words):    1. Long Term Goal: Management of symptoms  Target Date: 6 months  Person/Persons responsible for completion of goal: Patient    2.  Short Term Objective(s):  How will we reach this goal - Continue medications as prescribed  Target Date: 6 months  Person/Persons Responsible for Completion of Goal: Patient    Progress Towards Goals: Stable    Treatment Modality: Medication management every two months    Review due in 180 days from the date of this plan    Expected length of service: Follow up appointment every 1-2 months for maintenance    My Physician/PA/NP and I have developed this plan together and I agree to work on the goals and objectives. I understand the treatment goals that were developed for my treatment. I consent to the above treatment plan.    Follow up in 8 weeks    Start time: 520 PM    Stop time: 535 PM

## 2024-07-03 DIAGNOSIS — F20.0 PARANOID SCHIZOPHRENIA, CHRONIC CONDITION (HCC): ICD-10-CM

## 2024-07-05 RX ORDER — OLANZAPINE 10 MG/1
10 TABLET ORAL
Qty: 90 TABLET | Refills: 0 | Status: SHIPPED | OUTPATIENT
Start: 2024-07-05

## 2024-07-09 ENCOUNTER — TELEMEDICINE (OUTPATIENT)
Dept: PSYCHIATRY | Facility: CLINIC | Age: 44
End: 2024-07-09
Payer: COMMERCIAL

## 2024-07-09 DIAGNOSIS — F20.0 PARANOID SCHIZOPHRENIA, CHRONIC CONDITION (HCC): Primary | ICD-10-CM

## 2024-07-09 PROCEDURE — 99213 OFFICE O/P EST LOW 20 MIN: CPT | Performed by: PSYCHIATRY & NEUROLOGY

## 2024-07-09 NOTE — PSYCH
After connecting through [employer], patient was verified with two unique identifiers. Patient confirmed that they were at Home/Office and were in [place] at the time of the appointment.    Patient (or authorized legal representative) was then informed that this was a Telemedicine visit and that the exam was being conducted confidentially over secure lines. My office door was closed.  No one else was in the room with me.  Patient acknowledged consent and understanding of privacy and security of the Telemedicine visit, and gave permission to have a telemedicine presenter stay in the room in order to assist with the history and to conduct the exam as needed.  I informed the patient that I have reviewed their record in Epic and presented the opportunity for them to ask any questions regarding the visit today.  The patient agreed to participate.    Chief Complaint:    Psychosis    Interval Summary    Pt said that he is doing good since his last visit. He denied feeling depressed. He feels anxious on and off, mostly situational, and he is able to cope much better with his anxiety. He has not seen any difference with reduction in Zyprexa to 10 mg. He was willing to try 5 mg of Zyprexa and then go back to 10 mg if his symptoms worsen or recur. He said that he found a lab that is open on Saturday and he is getting his bloods done next Saturday. He denied any current stressors.     Psychiatric ROS    Sleep: Good, 7-8 hours, feels rested  Appetite: Good  Suicidal ideation: Denied  Homicidal ideation: Denied  Psychosis: Minimal voices, 1-2 times a week, coping well, no worsening recently  Makayla: None reported  PTSD: None reported  Panic attacks: None reported  Memory issues: None reported  Med compliance: Good  Side effects: None reported    D+A Use    Alcohol: Drinks 1 - 2 beers over the weekend  Nicotine: None  Other substances: None    Medical Issues  Acute medical issues at present: None  Last PCP Visit: 1  "year ago, will make an appt soon    Medical Review of Systems    No fever/dizziness/blurred vision/cough/chest pain/shortness of breath/abdominal pain/nausea/vomiting/bladder or bowel problems/headache/weakness/rigidity or pain reported. Any symptoms reported by patient are listed in the interval summary.    Mental Status Exam    Motor: Normal gait and no abnormal movements  General appearance and behavior: Moderately kempt, calm, cooperative, pleasant, good eye contact, good rapport, no psychomotor abnormalities noted  Speech: Spontaneous with normal rate, normal volume and normal tone  Mood: \"Good\"  Affect: Euthymic, congruent with mood, normal range, appropriate  Thought Process: linear  Thought content: Denied suicidal or homicidal ideation. No delusions elicited  Perception: Denied any auditory or visual hallucinations  Insight: Good  Judgment: Good  Orientation: Oriented to person, place and time  Attention/Concentration: Good  Memory - Grossly intact  Language - Fluent  Fund of Knowledge - Adequate    Assessment and Plan    Paranoid schizophrenia, chronic - Continue olanzapine 10 mg Po HS    CBC with diff, CMP, TSH, Lipid profile - orders done to [facility] as pt is on olanzapine for metabolic syndrome monitoring    The clinical diagnosis, course and prognosis were explained to the patient. Discussed with patient the clinical indications, interactions, benefits, the most common and serious side effects of all current medications. The alternative treatment options were discussed. The importance of continuing in psychotherapy was reinstated. The patient was receptive and appeared to understand the information provided. Patient's concerns and questions were addressed to patient's satisfaction during the appointment, and the patient agreed to the treatment plan.    Patient is aware of adverse effects of use of alcohol and/or other substances on mental illness and is aware of the risk of combining alcohol and/or " substances with the medications that are currently prescribed. The patient was advised go to the nearest emergency room or call 911 if new symptoms arise or existing symptoms worsen or has thoughts of hurting self or others.      Behavioral Health OP Treatment Plan      Name and Date of Birth:  Dave Kendall 44 y.o. 1980    Date of Treatment Plan: July 9, 2024    Diagnosis/Diagnoses:     1. Paranoid schizophrenia, chronic condition (HCC)          Strengths/Personal Resources for Self-Care: Good insight, compliant with treatment, good family support    Area/Areas of need (in own words):    1. Long Term Goal: Management of symptoms  Target Date: 6 months  Person/Persons responsible for completion of goal: Patient    2.  Short Term Objective(s):  How will we reach this goal - Continue medications as prescribed  Target Date: 6 months  Person/Persons Responsible for Completion of Goal: Patient    Progress Towards Goals: Stable    Treatment Modality: Medication management every two months    Review due in 180 days from the date of this plan    Expected length of service: Follow up appointment every 1-2 months for maintenance    My Physician/PA/NP and I have developed this plan together and I agree to work on the goals and objectives. I understand the treatment goals that were developed for my treatment. I consent to the above treatment plan.    Follow up in 8 weeks    Start time: 325 PM    Stop time: 340 PM

## 2024-08-31 ENCOUNTER — TRANSCRIBE ORDERS (OUTPATIENT)
Dept: LAB | Facility: CLINIC | Age: 44
End: 2024-08-31

## 2024-08-31 ENCOUNTER — APPOINTMENT (OUTPATIENT)
Dept: LAB | Facility: CLINIC | Age: 44
End: 2024-08-31
Payer: COMMERCIAL

## 2024-08-31 DIAGNOSIS — F20.0 PARANOID SCHIZOPHRENIA, SUBCHRONIC CONDITION (HCC): ICD-10-CM

## 2024-08-31 LAB
ALBUMIN SERPL BCG-MCNC: 4.5 G/DL (ref 3.5–5)
ALP SERPL-CCNC: 66 U/L (ref 34–104)
ALT SERPL W P-5'-P-CCNC: 27 U/L (ref 7–52)
ANION GAP SERPL CALCULATED.3IONS-SCNC: 5 MMOL/L (ref 4–13)
AST SERPL W P-5'-P-CCNC: 16 U/L (ref 13–39)
BASOPHILS # BLD AUTO: 0.04 THOUSANDS/ÂΜL (ref 0–0.1)
BASOPHILS NFR BLD AUTO: 1 % (ref 0–1)
BILIRUB SERPL-MCNC: 0.5 MG/DL (ref 0.2–1)
BUN SERPL-MCNC: 18 MG/DL (ref 5–25)
CALCIUM SERPL-MCNC: 8.9 MG/DL (ref 8.4–10.2)
CHLORIDE SERPL-SCNC: 106 MMOL/L (ref 96–108)
CHOLEST SERPL-MCNC: 203 MG/DL
CO2 SERPL-SCNC: 28 MMOL/L (ref 21–32)
CREAT SERPL-MCNC: 0.88 MG/DL (ref 0.6–1.3)
EOSINOPHIL # BLD AUTO: 0.12 THOUSAND/ÂΜL (ref 0–0.61)
EOSINOPHIL NFR BLD AUTO: 2 % (ref 0–6)
ERYTHROCYTE [DISTWIDTH] IN BLOOD BY AUTOMATED COUNT: 11.9 % (ref 11.6–15.1)
GFR SERPL CREATININE-BSD FRML MDRD: 105 ML/MIN/1.73SQ M
GLUCOSE P FAST SERPL-MCNC: 101 MG/DL (ref 65–99)
HCT VFR BLD AUTO: 43.8 % (ref 36.5–49.3)
HDLC SERPL-MCNC: 41 MG/DL
HGB BLD-MCNC: 14.8 G/DL (ref 12–17)
IMM GRANULOCYTES # BLD AUTO: 0.01 THOUSAND/UL (ref 0–0.2)
IMM GRANULOCYTES NFR BLD AUTO: 0 % (ref 0–2)
LDLC SERPL CALC-MCNC: 125 MG/DL (ref 0–100)
LYMPHOCYTES # BLD AUTO: 1.88 THOUSANDS/ÂΜL (ref 0.6–4.47)
LYMPHOCYTES NFR BLD AUTO: 31 % (ref 14–44)
MCH RBC QN AUTO: 29.6 PG (ref 26.8–34.3)
MCHC RBC AUTO-ENTMCNC: 33.8 G/DL (ref 31.4–37.4)
MCV RBC AUTO: 88 FL (ref 82–98)
MONOCYTES # BLD AUTO: 0.48 THOUSAND/ÂΜL (ref 0.17–1.22)
MONOCYTES NFR BLD AUTO: 8 % (ref 4–12)
NEUTROPHILS # BLD AUTO: 3.45 THOUSANDS/ÂΜL (ref 1.85–7.62)
NEUTS SEG NFR BLD AUTO: 58 % (ref 43–75)
NONHDLC SERPL-MCNC: 162 MG/DL
NRBC BLD AUTO-RTO: 0 /100 WBCS
PLATELET # BLD AUTO: 308 THOUSANDS/UL (ref 149–390)
PMV BLD AUTO: 8.6 FL (ref 8.9–12.7)
POTASSIUM SERPL-SCNC: 3.9 MMOL/L (ref 3.5–5.3)
PROT SERPL-MCNC: 7 G/DL (ref 6.4–8.4)
RBC # BLD AUTO: 5 MILLION/UL (ref 3.88–5.62)
SODIUM SERPL-SCNC: 139 MMOL/L (ref 135–147)
TRIGL SERPL-MCNC: 187 MG/DL
TSH SERPL DL<=0.05 MIU/L-ACNC: 1.76 UIU/ML (ref 0.45–4.5)
WBC # BLD AUTO: 5.98 THOUSAND/UL (ref 4.31–10.16)

## 2024-08-31 PROCEDURE — 80061 LIPID PANEL: CPT

## 2024-08-31 PROCEDURE — 80053 COMPREHEN METABOLIC PANEL: CPT

## 2024-08-31 PROCEDURE — 85025 COMPLETE CBC W/AUTO DIFF WBC: CPT

## 2024-08-31 PROCEDURE — 36415 COLL VENOUS BLD VENIPUNCTURE: CPT

## 2024-08-31 PROCEDURE — 84443 ASSAY THYROID STIM HORMONE: CPT

## 2024-09-03 ENCOUNTER — TELEMEDICINE (OUTPATIENT)
Dept: PSYCHIATRY | Facility: CLINIC | Age: 44
End: 2024-09-03
Payer: COMMERCIAL

## 2024-09-03 DIAGNOSIS — F20.0 PARANOID SCHIZOPHRENIA, CHRONIC CONDITION (HCC): Primary | ICD-10-CM

## 2024-09-03 PROCEDURE — 99213 OFFICE O/P EST LOW 20 MIN: CPT | Performed by: PSYCHIATRY & NEUROLOGY

## 2024-09-03 RX ORDER — OLANZAPINE 10 MG/1
10 TABLET ORAL
Qty: 90 TABLET | Refills: 1 | Status: SHIPPED | OUTPATIENT
Start: 2024-09-03

## 2024-09-03 NOTE — PSYCH
After connecting through [employer], patient was verified with two unique identifiers. Patient confirmed that they were at Home/Office and were in [place] at the time of the appointment.    Patient (or authorized legal representative) was then informed that this was a Telemedicine visit and that the exam was being conducted confidentially over secure lines. My office door was closed.  No one else was in the room with me.  Patient acknowledged consent and understanding of privacy and security of the Telemedicine visit, and gave permission to have a telemedicine presenter stay in the room in order to assist with the history and to conduct the exam as needed.  I informed the patient that I have reviewed their record in Epic and presented the opportunity for them to ask any questions regarding the visit today.  The patient agreed to participate.    Chief Complaint:    Psychosis    Interval Summary    Pt said that he is doing good since his last visit. He denied feeling depressed for the last few months. He said that he is doing well on 10 mg olanzapine. He feels much less anxious on 10 mg and he is able to cope well with his anxiety. He denied nay current stressors.    Psychiatric ROS    Sleep: Good, 7-8 hours, feels rested  Appetite: Good  Suicidal ideation: Denied  Homicidal ideation: Denied  Psychosis: Minimal voices, less intense, 1-2 times a week, coping well  Makayla: None reported  PTSD: None reported  Panic attacks: None reported  Memory issues: None reported  Med compliance: Good  Side effects: None reported    D+A Use    Alcohol: Drinks 1 - 2 beers over the weekend  Nicotine: None  Other substances: None    Medical Issues  Acute medical issues at present: None  Last PCP Visit: 1 year ago, will make an appt soon    Medical Review of Systems    No fever/dizziness/blurred vision/cough/chest pain/shortness of breath/abdominal pain/nausea/vomiting/bladder or bowel problems/headache/weakness/rigidity or  "pain reported. Any symptoms reported by patient are listed in the interval summary.    Mental Status Exam    Motor: Normal gait and no abnormal movements  General appearance and behavior: Moderately kempt, calm, cooperative, pleasant, good eye contact, good rapport, no psychomotor abnormalities noted  Speech: Spontaneous with normal rate, normal volume and normal tone  Mood: \"Good\"  Affect: Euthymic, congruent with mood, normal range, appropriate  Thought Process: linear  Thought content: Denied suicidal or homicidal ideation. No delusions elicited  Perception: Denied any auditory or visual hallucinations  Insight: Good  Judgment: Good  Orientation: Oriented to person, place and time  Attention/Concentration: Good  Memory - Grossly intact  Language - Fluent  Fund of Knowledge - Adequate    Assessment and Plan    Paranoid schizophrenia, chronic - Continue olanzapine 10 mg PO HS    His Cbc and CMP were WNL. His cholesterol, triglycerides and LDL were high. Advised him to see his PCP for further management.    The clinical diagnosis, course and prognosis were explained to the patient. Discussed with patient the clinical indications, interactions, benefits, the most common and serious side effects of all current medications. The alternative treatment options were discussed. The importance of continuing in psychotherapy was reinstated. The patient was receptive and appeared to understand the information provided. Patient's concerns and questions were addressed to patient's satisfaction during the appointment, and the patient agreed to the treatment plan.    Patient is aware of adverse effects of use of alcohol and/or other substances on mental illness and is aware of the risk of combining alcohol and/or substances with the medications that are currently prescribed. The patient was advised go to the nearest emergency room or call 911 if new symptoms arise or existing symptoms worsen or has thoughts of hurting self or " others.      Behavioral Health OP Treatment Plan      Name and Date of Birth:  Dave Kendall 44 y.o. 1980    Date of Treatment Plan: September 3, 2024    Diagnosis/Diagnoses:     1. Paranoid schizophrenia, chronic condition (HCC)          Strengths/Personal Resources for Self-Care: Good insight, compliant with treatment, good family support    Area/Areas of need (in own words):    1. Long Term Goal: Management of symptoms  Target Date: 6 months  Person/Persons responsible for completion of goal: Patient    2.  Short Term Objective(s):  How will we reach this goal - Continue medications as prescribed  Target Date: 6 months  Person/Persons Responsible for Completion of Goal: Patient    Progress Towards Goals: Stable    Treatment Modality: Medication management every two months    Review due in 180 days from the date of this plan    Expected length of service: Follow up appointment every 1-2 months for maintenance    My Physician/PA/NP and I have developed this plan together and I agree to work on the goals and objectives. I understand the treatment goals that were developed for my treatment. I consent to the above treatment plan.    Follow up in 8 weeks    Start time: 515 PM    Stop time: 530 PM

## 2024-09-09 ENCOUNTER — TELEPHONE (OUTPATIENT)
Dept: PSYCHIATRY | Facility: CLINIC | Age: 44
End: 2024-09-09

## 2024-11-05 ENCOUNTER — TELEMEDICINE (OUTPATIENT)
Dept: PSYCHIATRY | Facility: CLINIC | Age: 44
End: 2024-11-05
Payer: COMMERCIAL

## 2024-11-05 DIAGNOSIS — F20.0 PARANOID SCHIZOPHRENIA, CHRONIC CONDITION (HCC): Primary | ICD-10-CM

## 2024-11-05 PROCEDURE — 99214 OFFICE O/P EST MOD 30 MIN: CPT | Performed by: PSYCHIATRY & NEUROLOGY

## 2024-11-05 NOTE — PSYCH
After connecting through [email or web address], patient was verified with two unique identifiers. Patient confirmed that they were at Home/Office and were in [place] at the time of the appointment.    Patient (or authorized legal representative) was then informed that this was a Telemedicine visit and that the exam was being conducted confidentially over secure lines. My office door was closed.  No one else was in the room with me.  Patient acknowledged consent and understanding of privacy and security of the Telemedicine visit, and gave permission to have a telemedicine presenter stay in the room in order to assist with the history and to conduct the exam as needed.  I informed the patient that I have reviewed their record in Epic and presented the opportunity for them to ask any questions regarding the visit today.  The patient agreed to participate.    Chief Complaint:    Psychosis    Interval Summary    Pt said that he is doing good since his last visit. He said that he is doing pretty well with 10 mg olanzapine. He denied any depression in the last few months. He said that he is coping well with his voices. He has anxiety on and off, mostly and able to cope with it. He denied other stressors. He got back from a vacation with family to [place]. He feels better when he works out and he goes 3-4 times to the gym.    Psychiatric ROS    Sleep: Good, 8-9 hours, feels rested  Appetite: Good  Suicidal ideation: Denied  Homicidal ideation: Denied  Psychosis: Minimal voices, less intense, 1-2 times a week, coping well  Makayla: None reported  PTSD: None reported  Panic attacks: None reported  Memory issues: None reported  Med compliance: Good  Side effects: None reported    D+A Use    Alcohol: Drinks 1 - 2 beers/mixed drink over the weekend  Nicotine: None  Other substances: None    Medical Issues  Acute medical issues at present: None  Last PCP Visit: 1 year ago, will make an appt soon    Medical Review of  "Systems    No fever/dizziness/blurred vision/cough/chest pain/shortness of breath/abdominal pain/nausea/vomiting/bladder or bowel problems/headache/weakness/rigidity or pain reported. Any symptoms reported by patient are listed in the interval summary.    Mental Status Exam    Motor: Normal gait and no abnormal movements  General appearance and behavior: Moderately kempt, calm, cooperative, pleasant, good eye contact, good rapport, no psychomotor abnormalities noted  Speech: Spontaneous with normal rate, normal volume and normal tone  Mood: \"Good\"  Affect: Euthymic, congruent with mood, normal range, appropriate  Thought Process: linear  Thought content: Denied suicidal or homicidal ideation. No delusions elicited  Perception: Denied any auditory or visual hallucinations  Insight: Good  Judgment: Good  Orientation: Oriented to person, place and time  Attention/Concentration: Good  Memory - Grossly intact  Language - Fluent  Fund of Knowledge - Adequate    Assessment and Plan    Paranoid schizophrenia, chronic - Continue olanzapine 10 mg PO HS    His Cbc and CMP were WNL. His cholesterol, triglycerides and LDL were high. Advised him to see his PCP for further management.    The clinical diagnosis, course and prognosis were explained to the patient. Discussed with patient the clinical indications, interactions, benefits, the most common and serious side effects of all current medications. The alternative treatment options were discussed. The importance of continuing in psychotherapy was reinstated. The patient was receptive and appeared to understand the information provided. Patient's concerns and questions were addressed to patient's satisfaction during the appointment, and the patient agreed to the treatment plan.    Patient is aware of adverse effects of use of alcohol and/or other substances on mental illness and is aware of the risk of combining alcohol and/or substances with the medications that are currently " prescribed. The patient was advised go to the nearest emergency room or call 911 if new symptoms arise or existing symptoms worsen or has thoughts of hurting self or others.      Behavioral Health OP Treatment Plan      Name and Date of Birth:  Dave Kendall 44 y.o. 1980    Date of Treatment Plan: November 5, 2024    Diagnosis/Diagnoses:     1. Paranoid schizophrenia, chronic condition (HCC)          Strengths/Personal Resources for Self-Care: Good insight, compliant with treatment, good family support    Area/Areas of need (in own words):    1. Long Term Goal: Management of symptoms  Target Date: 6 months  Person/Persons responsible for completion of goal: Patient    2.  Short Term Objective(s):  How will we reach this goal - Continue medications as prescribed  Target Date: 6 months  Person/Persons Responsible for Completion of Goal: Patient    Progress Towards Goals: Stable    Treatment Modality: Medication management every two months    Review due in 180 days from the date of this plan    Expected length of service: Follow up appointment every 1-2 months for maintenance    My Physician/PA/NP and I have developed this plan together and I agree to work on the goals and objectives. I understand the treatment goals that were developed for my treatment. I consent to the above treatment plan.    Follow up in 8 weeks    Start time: 500 PM    Stop time: 525 PM

## 2024-11-05 NOTE — BH TREATMENT PLAN
"TREATMENT PLAN (Medication Management Only)        [facility]    Name and Date of Birth:  Dave Kendall 44 y.o. 1980  Date of Treatment Plan: November 5, 2024  Diagnosis/Diagnoses:    1. Paranoid schizophrenia, chronic condition (HCC)      Strengths/Personal Resources for Self-Care: taking medications as prescribed, independence.  Area/Areas of need (in own words): \"Psychosis\"  1. Long Term Goal: continue improvement in psychotic symptoms.  Target Date:6 months - 5/5/2025  Person/Persons responsible for completion of goal: Dave  2.  Short Term Objective (s) - How will we reach this goal?:   A. Provider new recommended medication/dosage changes and/or continue medication(s): continue current medications as prescribed.  B. N/A.  C. N/A.  Target Date:6 months - 5/5/2025  Person/Persons Responsible for Completion of Goal: Dave  Progress Towards Goals: continuing treatment  Treatment Modality: medication management every 2 months  Review due 180 days from date of this plan: 6 months - 5/5/2025  Expected length of service: ongoing treatment  My Physician/PA/NP and I have developed this plan together and I agree to work on the goals and objectives. I understand the treatment goals that were developed for my treatment.      "

## 2024-11-05 NOTE — BH CRISIS PLAN
Client Name: Dave Kendall       Client YOB: 1980    [employer] Safety Plan      Creation Date: 11/5/24    Created By: Mayra Mcginnis MD       Step 1: Warning Signs:   Warning Signs   Paranoia            Step 2: Internal Coping Strategies:   Internal Coping Strategies   Distraction   walking away            Step 3: People and social settings that provide distraction:   Name Contact Information   Parents             Step 4: People whom I can ask for help during a crisis:      Name Contact Information    Parents       Step 5: Professionals or agencies I can contact during a crisis:      [facility]/Agency Name Phone Emergency Contact    [employer]          Crisis Phone Numbers:   Suicide Prevention Lifeline: Call or Text  988 Crisis Text Line: Text HOME to 937-872   Please note: Some [facility] do not have a separate number for Child/Adolescent specific crisis. If your county is not listed under Child/Adolescent, please call the adult number for your county      Adult Crisis Numbers: Child/Adolescent Crisis Numbers   [facility]: [phone] [facility]: [phone]   [facility]: [phone] [facility]: [phone]   [facility]: [phone] [place], [place]: [phone]   [facility]: [phone] [place]: [phone]   [facility]: [phone]   [facility]: [phone]   [facility]: [phone]   [facility]: [phone] (daytime) [phone] (after hours, weekends, holidays)      Step 6: Making the environment safer (plan for lethal means safety):      Optional: What is most important to me and worth living for?      [facility] Safety Plan. Denisa Marion and Kenrick Zavala. Used with permission of the authors.

## 2025-01-07 ENCOUNTER — TELEPHONE (OUTPATIENT)
Dept: PSYCHIATRY | Facility: CLINIC | Age: 45
End: 2025-01-07

## 2025-01-07 ENCOUNTER — TELEMEDICINE (OUTPATIENT)
Dept: PSYCHIATRY | Facility: CLINIC | Age: 45
End: 2025-01-07
Payer: COMMERCIAL

## 2025-01-07 DIAGNOSIS — F20.0 PARANOID SCHIZOPHRENIA, CHRONIC CONDITION (HCC): Primary | ICD-10-CM

## 2025-01-07 PROCEDURE — 99213 OFFICE O/P EST LOW 20 MIN: CPT | Performed by: PSYCHIATRY & NEUROLOGY

## 2025-01-07 RX ORDER — OLANZAPINE 10 MG/1
10 TABLET ORAL
Qty: 90 TABLET | Refills: 1 | Status: SHIPPED | OUTPATIENT
Start: 2025-01-07

## 2025-01-07 NOTE — BH CRISIS PLAN
Client Name: Dave Kendall       Client YOB: 1980    [employer] Safety Plan      Creation Date: 11/5/24    Created By: Mayra Mcginnis MD       Step 1: Warning Signs:   Warning Signs   Paranoia            Step 2: Internal Coping Strategies:   Internal Coping Strategies   Distraction   walking away            Step 3: People and social settings that provide distraction:   Name Contact Information   Parents             Step 4: People whom I can ask for help during a crisis:      Name Contact Information    Parents       Step 5: Professionals or agencies I can contact during a crisis:      [employer]/Agency Name Phone Emergency Contact    [employer]          Crisis Phone Numbers:   Suicide Prevention Lifeline: Call or Text  988 Crisis Text Line: Text HOME to 440-370   Please note: Some [facility] do not have a separate number for Child/Adolescent specific crisis. If your county is not listed under Child/Adolescent, please call the adult number for your county      Adult Crisis Numbers: Child/Adolescent Crisis Numbers   [facility]: [phone] [facility]: [phone]   [facility]: [phone] [facility]: [phone]   [facility]: [phone] [place]: [phone]   [facility]: [phone] [place]: [phone]   [facility]: [phone]   [facility]: [phone]   [facility]: [phone]   [facility]: [phone] (daytime) [phone] (after hours, weekends, holidays)      Step 6: Making the environment safer (plan for lethal means safety):      Optional: What is most important to me and worth living for?      [facility]as Safety Plan. Denisa Marion and Kenrick Zavala. Used with permission of the authors.

## 2025-01-07 NOTE — PSYCH
After connecting through [employer] by [employer], patient was verified with two unique identifiers. Patient confirmed that they were at Home/Office and were in [place] at the time of the appointment.    Patient (or authorized legal representative) was then informed that this was a Telemedicine visit and that the exam was being conducted confidentially over secure lines. My office door was closed.  No one else was in the room with me.  Patient acknowledged consent and understanding of privacy and security of the Telemedicine visit, and gave permission to have a telemedicine presenter stay in the room in order to assist with the history and to conduct the exam as needed.  I informed the patient that I have reviewed their record in Epic and presented the opportunity for them to ask any questions regarding the visit today.  The patient agreed to participate.    Chief Complaint:    Psychosis    Interval Summary    Pt said that he is doing good since his last visit. He denied feeling depressed in the last few months. He said that he feels anxious on and off, 1-2 times a week, mostly situational and coping wlel with it. He said that his voices are minimal and coping well with them. He denied any current stressors. He is busy with work and doing well at home.    Psychiatric ROS    Sleep: Good, 8-9 hours, feels rested  Appetite: Good  Suicidal ideation: Denied  Homicidal ideation: Denied  Psychosis: Minimal voices, less intense, 1-2 times a week, coping well  Makayla: None reported  PTSD: None reported  Panic attacks: None reported  Memory issues: None reported  Med compliance: Good  Side effects: None reported    D+A Use    Alcohol: Drinks 1 - 2 beers/mixed drink over the weekend  Nicotine: None  Other substances: None    Medical Issues  Acute medical issues at present: None  Last PCP Visit: 1 year ago, will make an appt soon    Medical Review of Systems    No fever/dizziness/blurred vision/cough/chest pain/shortness of  "breath/abdominal pain/nausea/vomiting/bladder or bowel problems/headache/weakness/rigidity or pain reported. Any symptoms reported by patient are listed in the interval summary.    Mental Status Exam    Motor: Normal gait and no abnormal movements  General appearance and behavior: Moderately kempt, calm, cooperative, pleasant, good eye contact, good rapport, no psychomotor abnormalities noted  Speech: Spontaneous with normal rate, normal volume and normal tone  Mood: \"Good\"  Affect: Euthymic, congruent with mood, normal range, appropriate  Thought Process: linear  Thought content: Denied suicidal or homicidal ideation. No delusions elicited  Perception: Denied any auditory or visual hallucinations  Insight: Good  Judgment: Good  Orientation: Oriented to person, place and time  Attention/Concentration: Good  Memory - Grossly intact  Language - Fluent  Fund of Knowledge - Adequate    Assessment and Plan    Paranoid schizophrenia, chronic - Continue olanzapine 10 mg PO HS    His Cbc and CMP were WNL. His cholesterol, triglycerides and LDL were high. Advised him to see his PCP for further management.    The clinical diagnosis, course and prognosis were explained to the patient. Discussed with patient the clinical indications, interactions, benefits, the most common and serious side effects of all current medications. The alternative treatment options were discussed. The importance of continuing in psychotherapy was reinstated. The patient was receptive and appeared to understand the information provided. Patient's concerns and questions were addressed to patient's satisfaction during the appointment, and the patient agreed to the treatment plan.    Patient is aware of adverse effects of use of alcohol and/or other substances on mental illness and is aware of the risk of combining alcohol and/or substances with the medications that are currently prescribed. The patient was advised go to the nearest emergency room or call " 911 if new symptoms arise or existing symptoms worsen or has thoughts of hurting self or others.      Behavioral Health OP Treatment Plan      Name and Date of Birth:  Dave Kendall 44 y.o. 1980    Date of Treatment Plan: January 7, 2025    Diagnosis/Diagnoses:     1. Paranoid schizophrenia, chronic condition (HCC)          Strengths/Personal Resources for Self-Care: Good insight, compliant with treatment, good family support    Area/Areas of need (in own words):    1. Long Term Goal: Management of symptoms  Target Date: 6 months  Person/Persons responsible for completion of goal: Patient    2.  Short Term Objective(s):  How will we reach this goal - Continue medications as prescribed  Target Date: 6 months  Person/Persons Responsible for Completion of Goal: Patient    Progress Towards Goals: Stable    Treatment Modality: Medication management every two months    Review due in 180 days from the date of this plan    Expected length of service: Follow up appointment every 1-2 months for maintenance    My Physician/PA/NP and I have developed this plan together and I agree to work on the goals and objectives. I understand the treatment goals that were developed for my treatment. I consent to the above treatment plan.    Follow up in 12 weeks    Start time: 500 PM    Stop time: 522 PM

## 2025-01-07 NOTE — TELEPHONE ENCOUNTER
Left a voicemail that we do need the Virtual Care Consent form signed. In order to have his appointment with Dr. Mcginnis, this must be signed. 1/7/2025.

## 2025-03-17 DIAGNOSIS — Z79.899 ENCOUNTER FOR LONG-TERM (CURRENT) USE OF MEDICATIONS: Primary | ICD-10-CM

## 2025-04-01 ENCOUNTER — TELEMEDICINE (OUTPATIENT)
Dept: PSYCHIATRY | Facility: CLINIC | Age: 45
End: 2025-04-01
Payer: COMMERCIAL

## 2025-04-01 DIAGNOSIS — F20.0 PARANOID SCHIZOPHRENIA, CHRONIC CONDITION (HCC): Primary | ICD-10-CM

## 2025-04-01 PROCEDURE — 99213 OFFICE O/P EST LOW 20 MIN: CPT | Performed by: PSYCHIATRY & NEUROLOGY

## 2025-04-01 NOTE — PSYCH
Administrative Statements   Encounter provider Mayra Mcginnis MD    The Patient is located at Home and in the following state in which I hold an active license [place].    The patient was identified by name and date of birth. Dave Kendall was informed that this is a telemedicine visit and that the visit is being conducted through the Epic Embedded platform. He agrees to proceed..  My office door was closed. No one else was in the room.  He acknowledged consent and understanding of privacy and security of the video platform. The patient has agreed to participate and understands they can discontinue the visit at any time.    I have spent a total time of 20 minutes in caring for this patient on the day of the visit/encounter including Counseling / Coordination of care, Documenting in the medical record, Reviewing/placing orders in the medical record (including tests, medications, and/or procedures), and Obtaining or reviewing history  , not including the time spent for establishing the audio/video connection.      Chief Complaint:    Psychosis    Interval Summary    Pt said that he is doing good since his last visit. He said that he was physically sick last few days with cold and congestion and is doing better today. He denied feeling depressed in the last few months. He is anxious, on and off, 1-2 times a week, mostly situational, less intense and coping well with it. He said that he hears voices, much less intense, 1-2 times a week and able to deal better with them. He reported much less paranoia robbin when around groups of people, but able to cope and distract himself. He denied any other current stressors. He works out 3-4 times a week and feels good mentally and physically.    Psychiatric ROS    Sleep: Good, 8 hours, feels rested  Appetite: Good, weight has been stable, works out regularly  Suicidal ideation: Denied  Homicidal ideation: Denied  Psychosis: Minimal voices, less intense, 1-2 times a week, coping  "well  Makayla: None reported  PTSD: None reported  Panic attacks: None reported  Memory issues: None reported  Med compliance: Good  Side effects: None reported    D+A Use    Alcohol: Drinks 1 - 2 beers/mixed drink once a week, on weekends  Nicotine: None  Other substances: None    Medical Issues  Acute medical issues at present: None  Last PCP Visit: 1 year ago, will make an appt soon, bloods done in 8/2024, lipids and glucose were high. Advised to see his PCP.    Medical Review of Systems    No fever/dizziness/blurred vision/cough/chest pain/shortness of breath/abdominal pain/nausea/vomiting/bladder or bowel problems/headache/weakness/rigidity or pain reported. Any symptoms reported by patient are listed in the interval summary.    Mental Status Exam    Motor: Normal gait and no abnormal movements  General appearance and behavior: Moderately kempt, calm, cooperative, pleasant, good eye contact, good rapport, no psychomotor abnormalities noted  Speech: Spontaneous with normal rate, normal volume and normal tone  Mood: \"Good\"  Affect: Euthymic, congruent with mood, normal range, appropriate  Thought Process: linear  Thought content: Denied suicidal or homicidal ideation. No delusions elicited  Perception: Denied any auditory or visual hallucinations  Insight: Good  Judgment: Good  Orientation: Oriented to person, place and time  Attention/Concentration: Good  Memory - Grossly intact  Language - Fluent  Fund of Knowledge - Adequate    Assessment and Plan    Paranoid schizophrenia, chronic - Continue olanzapine 10 mg PO HS    His CBC and CMP were WNL. His cholesterol, triglycerides and LDL were high in 8/2024. Advised him to see his PCP for further management.    The clinical diagnosis, course and prognosis were explained to the patient. Discussed with patient the clinical indications, interactions, benefits, the most common and serious side effects of all current medications. The alternative treatment options were " discussed. The importance of continuing in psychotherapy was reinstated. The patient was receptive and appeared to understand the information provided. Patient's concerns and questions were addressed to patient's satisfaction during the appointment, and the patient agreed to the treatment plan.    Patient is aware of adverse effects of use of alcohol and/or other substances on mental illness and is aware of the risk of combining alcohol and/or substances with the medications that are currently prescribed. The patient was advised go to the nearest emergency room or call 911 if new symptoms arise or existing symptoms worsen or has thoughts of hurting self or others.    Follow up in 12 weeks    Start time: 500 PM    Stop time: 520 PM

## 2025-07-01 ENCOUNTER — TELEMEDICINE (OUTPATIENT)
Dept: PSYCHIATRY | Facility: CLINIC | Age: 45
End: 2025-07-01
Payer: COMMERCIAL

## 2025-07-01 DIAGNOSIS — F20.0 PARANOID SCHIZOPHRENIA, CHRONIC CONDITION (HCC): Primary | ICD-10-CM

## 2025-07-01 PROCEDURE — 99213 OFFICE O/P EST LOW 20 MIN: CPT | Performed by: PSYCHIATRY & NEUROLOGY

## 2025-07-01 RX ORDER — OLANZAPINE 10 MG/1
10 TABLET, FILM COATED ORAL
Qty: 90 TABLET | Refills: 1 | Status: SHIPPED | OUTPATIENT
Start: 2025-07-01

## 2025-07-01 NOTE — BH CRISIS PLAN
Client Name: Dave Kendall       Client YOB: 1980    [facility] Safety Plan      Creation Date: 11/5/24    Created By: Mayra Mcginnis MD       Step 1: Warning Signs:   Warning Signs   Paranoia            Step 2: Internal Coping Strategies:   Internal Coping Strategies   Distraction   walking away            Step 3: People and social settings that provide distraction:   Name Contact Information   Parents             Step 4: People whom I can ask for help during a crisis:      Name Contact Information    Parents       Step 5: Professionals or agencies I can contact during a crisis:      Clinican/Agency Name Phone Emergency Contact    [employer]          Crisis Phone Numbers:   Suicide Prevention Lifeline: Call or Text  988 Crisis Text Line: Text HOME to 177-811   Please note: Some [facility] do not have a separate number for Child/Adolescent specific crisis. If your county is not listed under Child/Adolescent, please call the adult number for your county      Adult Crisis Numbers: Child/Adolescent Crisis Numbers   [facility]: [phone] [facility]: [phone]   [facility]: [phone] [facility]: [phone]   [facility]: [phone] [place]: [phone]   [facility]: [phone] [place]: [phone]   [facility]: [phone]   [facility]: [phone]   [facility]: [phone]   [facility]: [phone] (daytime) [phone] (after hours, weekends, holidays)      Step 6: Making the environment safer (plan for lethal means safety):      Optional: What is most important to me and worth living for?      [place] Safety Plan. Denisa Marion and Kenrick Zavala. Used with permission of the authors.

## 2025-07-01 NOTE — PSYCH
Administrative Statements   Encounter provider Mayra Mcginnis MD    The Patient is located at Home and in the following state in which I hold an active license [place].    The patient was identified by name and date of birth. Dave Kendall was informed that this is a telemedicine visit and that the visit is being conducted through the Epic Embedded platform. He agrees to proceed..  My office door was closed. No one else was in the room.  He acknowledged consent and understanding of privacy and security of the video platform. The patient has agreed to participate and understands they can discontinue the visit at any time.    I have spent a total time of 23 minutes in caring for this patient on the day of the visit/encounter including Counseling / Coordination of care, Documenting in the medical record, Reviewing/placing orders in the medical record (including tests, medications, and/or procedures), and Obtaining or reviewing history  , not including the time spent for establishing the audio/video connection.    Chief Complaint:    Psychosis    Interval Summary    Pt said that he is doing great since his last visit. He said that he has been busy with work and has been doing very well. 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 He denied feeling depressed, anxious or paranoid. He denied any other stressors.     Psychiatric ROS    Sleep: Good, 7-8 hours, feels rested  Appetite: Good, weight has been stable, works out regularly  Suicidal ideation: Denied  Homicidal ideation: Denied  Psychosis: Minimal voices, less intense, 1-2 times a week, coping well  Makayla: None reported  PTSD: None reported  Panic attacks: None reported  Memory issues: None reported  Med compliance: Good  Side effects: None reported    D+A Use    Alcohol: Drinks 1 - 2 beers/mixed drink once a week, on weekends  Nicotine: None  Other substances: None    Medical  "Issues  Acute medical issues at present: None  Last PCP Visit: Next week    Medical Review of Systems    No fever/dizziness/blurred vision/cough/chest pain/shortness of breath/abdominal pain/nausea/vomiting/bladder or bowel problems/headache/weakness/rigidity or pain reported. Any symptoms reported by patient are listed in the interval summary.    Mental Status Exam    Motor: Normal gait and no abnormal movements  General appearance and behavior: Moderately kempt, calm, cooperative, pleasant, good eye contact, good rapport, no psychomotor abnormalities noted  Speech: Spontaneous with normal rate, normal volume and normal tone  Mood: \"Good\"  Affect: Euthymic, congruent with mood, normal range, appropriate  Thought Process: linear  Thought content: Denied suicidal or homicidal ideation. No delusions elicited  Perception: Denied any auditory or visual hallucinations  Insight: Good  Judgment: Good  Orientation: Oriented to person, place and time  Attention/Concentration: Good  Memory - Grossly intact  Language - Fluent  Fund of Knowledge - Adequate    Assessment and Plan    Paranoid schizophrenia, chronic - Continue olanzapine 10 mg PO HS    Advised him to get his CBC, CMP, lipid profile, HbA1c and TSH when he sees his PCP next week. He prefers to get his bloods done by his PCP.    The clinical diagnosis, course and prognosis were explained to the patient. Discussed with patient the clinical indications, interactions, benefits, the most common and serious side effects of all current medications. The alternative treatment options were discussed. The importance of continuing in psychotherapy was reinstated. The patient was receptive and appeared to understand the information provided. Patient's concerns and questions were addressed to patient's satisfaction during the appointment, and the patient agreed to the treatment plan.    Patient is aware of adverse effects of use of alcohol and/or other substances on mental " illness and is aware of the risk of combining alcohol and/or substances with the medications that are currently prescribed. The patient was advised go to the nearest emergency room or call 911 if new symptoms arise or existing symptoms worsen or has thoughts of hurting self or others.    Follow up in 12 weeks    Start time: 545 PM    Stop time: 608 PM

## 2025-07-01 NOTE — BH TREATMENT PLAN
"TREATMENT PLAN (Medication Management Only)        [facility]    Name and Date of Birth:  Dave Kendall 45 y.o. 1980  Date of Treatment Plan: July 1, 2025  Diagnosis/Diagnoses:    1. Paranoid schizophrenia, chronic condition (HCC)      Strengths/Personal Resources for Self-Care: taking medications as prescribed, independence.  Area/Areas of need (in own words): \"Psychosis\"  1. Long Term Goal: continue improvement in psychotic symptoms.  Target Date:6 months - 1/1/2026  Person/Persons responsible for completion of goal: Dave  2.  Short Term Objective (s) - How will we reach this goal?:   A. Provider new recommended medication/dosage changes and/or continue medication(s): continue current medications as prescribed.  B. N/A.  C. N/A.  Target Date:6 months - 1/1/2026  Person/Persons Responsible for Completion of Goal: Dave  Progress Towards Goals: continuing treatment  Treatment Modality: medication management every 2 months  Review due 180 days from date of this plan: 6 months - 1/1/2026  Expected length of service: ongoing treatment  My Physician/PA/NP and I have developed this plan together and I agree to work on the goals and objectives. I understand the treatment goals that were developed for my treatment.      "

## 2025-07-02 ENCOUNTER — TELEPHONE (OUTPATIENT)
Dept: PSYCHIATRY | Facility: CLINIC | Age: 45
End: 2025-07-02

## 2025-07-02 NOTE — TELEPHONE ENCOUNTER
Writer YAW about scheduling 12 week f/u appointment with Dr. Mcginnis and provided the phone number for the access center for patient to call back to schedule.

## 2025-07-08 ENCOUNTER — OFFICE VISIT (OUTPATIENT)
Dept: FAMILY MEDICINE CLINIC | Facility: CLINIC | Age: 45
End: 2025-07-08
Payer: COMMERCIAL

## 2025-07-08 VITALS
BODY MASS INDEX: 28.51 KG/M2 | RESPIRATION RATE: 20 BRPM | HEIGHT: 66 IN | OXYGEN SATURATION: 98 % | SYSTOLIC BLOOD PRESSURE: 120 MMHG | HEART RATE: 64 BPM | TEMPERATURE: 97.4 F | WEIGHT: 177.4 LBS | DIASTOLIC BLOOD PRESSURE: 80 MMHG

## 2025-07-08 DIAGNOSIS — Z00.00 ANNUAL PHYSICAL EXAM: Primary | ICD-10-CM

## 2025-07-08 DIAGNOSIS — R73.01 IFG (IMPAIRED FASTING GLUCOSE): ICD-10-CM

## 2025-07-08 DIAGNOSIS — F20.0 PARANOID SCHIZOPHRENIA, CHRONIC CONDITION (HCC): ICD-10-CM

## 2025-07-08 DIAGNOSIS — E78.2 MIXED HYPERLIPIDEMIA: ICD-10-CM

## 2025-07-08 PROCEDURE — 99386 PREV VISIT NEW AGE 40-64: CPT | Performed by: FAMILY MEDICINE

## 2025-07-08 NOTE — PROGRESS NOTES
Adult Annual Physical  Name: Dave Kendall      : 1980      MRN: [id number]  Encounter Provider: Taisha Logan DO  Encounter Date: 2025   Encounter department: [facility]    :  Assessment & Plan  Paranoid schizophrenia, chronic condition (HCC)  On olanzapine 10 mg daily per psychiatry     Orders:    Lipid panel; Future    Comprehensive metabolic panel; Future    CBC and differential; Future    Hemoglobin A1C With EAG; Future    TSH, 3rd generation with Free T4 reflex; Future    IFG (impaired fasting glucose)    Orders:    Lipid panel; Future    Comprehensive metabolic panel; Future    CBC and differential; Future    Hemoglobin A1C With EAG; Future    TSH, 3rd generation with Free T4 reflex; Future    Mixed hyperlipidemia    Orders:    Lipid panel; Future    Comprehensive metabolic panel; Future    CBC and differential; Future    Hemoglobin A1C With EAG; Future    TSH, 3rd generation with Free T4 reflex; Future    Annual physical exam    Orders:    Lipid panel; Future    Comprehensive metabolic panel; Future    CBC and differential; Future    Hemoglobin A1C With EAG; Future    TSH, 3rd generation with Free T4 reflex; Future        Preventive Screenings:  - Diabetes Screening: screening up-to-date and risks/benefits discussed  - Cholesterol Screening: screening not indicated and has hyperlipidemia   - Lung cancer screening: screening not indicated   - Prostate cancer screening: screening not indicated       Depression Screening and Follow-up Plan: Patient was screened for depression during today's encounter. They screened negative with a PHQ-2 score of 0.          History of Present Illness     Adult Annual Physical:  Patient presents for annual physical.     Diet and Physical Activity:  - Diet/Nutrition: well balanced diet.  - Exercise: moderate cardiovascular exercise.    Depression Screening:  - PHQ-2 Score: 0    Review of Systems   Constitutional:  Negative for chills  "and fever.   HENT:  Negative for congestion, postnasal drip, rhinorrhea and sinus pain.    Eyes:  Negative for photophobia and visual disturbance.   Respiratory:  Negative for cough and shortness of breath.    Cardiovascular:  Negative for chest pain, palpitations and leg swelling.   Gastrointestinal:  Negative for abdominal pain, constipation, diarrhea, nausea and vomiting.   Genitourinary:  Negative for difficulty urinating and dysuria.   Musculoskeletal:  Negative for arthralgias and myalgias.   Skin:  Negative for rash.   Neurological:  Negative for dizziness and syncope.     Pertinent Medical History         Medical History Reviewed by provider this encounter:  Tobacco  Allergies  Meds  Problems  Med Hx  Surg Hx  Fam Hx     .  Past Medical History   Past Medical History[1]  Past Surgical History[2]  Family History[3]   reports that he quit smoking about 7 years ago. His smoking use included cigarettes. He started smoking about 23 years ago. He has a 4.8 pack-year smoking history. He has never used smokeless tobacco. He reports current alcohol use of about 3.0 - 4.0 standard drinks of alcohol per week. He reports that he does not use drugs.  Current Outpatient Medications   Medication Instructions    OLANZapine (ZYPREXA) 10 mg, Oral, Daily at bedtime   Allergies[4]   Medications Ordered Prior to Encounter[5]   Social History[6]    Objective   /80   Pulse 64   Temp (!) 97.4 °F (36.3 °C) (Tympanic)   Resp 20   Ht 5' 5.6\" (1.666 m)   Wt 80.5 kg (177 lb 6.4 oz)   SpO2 98%   BMI 28.98 kg/m²     Physical Exam  Constitutional:       General: He is not in acute distress.     Appearance: Normal appearance. He is not ill-appearing, toxic-appearing or diaphoretic.   HENT:      Head: Normocephalic and atraumatic.      Right Ear: Tympanic membrane and ear canal normal.      Left Ear: Tympanic membrane and ear canal normal.      Nose: Nose normal. No congestion.      Mouth/Throat:      Mouth: Mucous " membranes are moist.      Pharynx: Oropharynx is clear. No oropharyngeal exudate.     Eyes:      Extraocular Movements: Extraocular movements intact.      Conjunctiva/sclera: Conjunctivae normal.      Pupils: Pupils are equal, round, and reactive to light.       Cardiovascular:      Rate and Rhythm: Normal rate and regular rhythm.      Pulses: Normal pulses.      Heart sounds: No murmur heard.  Pulmonary:      Effort: Pulmonary effort is normal.      Breath sounds: Normal breath sounds. No wheezing, rhonchi or rales.   Abdominal:      General: Bowel sounds are normal. There is no distension.      Palpations: Abdomen is soft.      Tenderness: There is no abdominal tenderness.     Musculoskeletal:         General: No swelling or tenderness. Normal range of motion.      Cervical back: Normal range of motion and neck supple.     Skin:     General: Skin is warm and dry.      Capillary Refill: Capillary refill takes less than 2 seconds.     Neurological:      General: No focal deficit present.      Mental Status: He is alert and oriented to person, place, and time.      Cranial Nerves: No cranial nerve deficit.     Psychiatric:         Mood and Affect: Mood normal.         Behavior: Behavior normal.         Thought Content: Thought content normal.              [1]   Past Medical History:  Diagnosis Date    Lyme disease     Patient denies medical problems 07/29/2019    Schizophrenia ([facility]) 2008   [2]   Past Surgical History:  Procedure Laterality Date    MOUTH SURGERY  1998   [3]   Family History  Problem Relation Name Age of Onset    Diabetes Mother      Diabetes Father     [4] No Known Allergies  [5]   Current Outpatient Medications on File Prior to Visit   Medication Sig Dispense Refill    OLANZapine (ZyPREXA) 10 mg tablet Take 1 tablet (10 mg total) by mouth daily at bedtime 90 tablet 1     No current facility-administered medications on file prior to visit.   [6]   Social History  Tobacco Use    Smoking status: Former      Current packs/day: 0.00     Average packs/day: 0.3 packs/day for 18.7 years (4.8 ttl pk-yrs)     Types: Cigarettes     Start date: 2002     Quit date: 2018     Years since quittin.5    Smokeless tobacco: Never    Tobacco comments:     Smokes socially   Vaping Use    Vaping status: Never Used   Substance and Sexual Activity    Alcohol use: Yes     Alcohol/week: 3.0 - 4.0 standard drinks of alcohol     Types: 3 - 4 Standard drinks or equivalent per week    Drug use: Never    Sexual activity: Yes

## 2025-07-08 NOTE — PATIENT INSTRUCTIONS
"Patient Education     Routine physical for adults   The Basics   Written by the doctors and editors at [facility]   What is a physical? -- A physical is a routine visit, or \"check-up,\" with your doctor. You might also hear it called a \"wellness visit\" or \"preventive visit.\"  During each visit, the doctor will:   Ask about your physical and mental health   Ask about your habits, behaviors, and lifestyle   Do an exam   Give you vaccines if needed   Talk to you about any medicines you take   Give advice about your health   Answer your questions  Getting regular check-ups is an important part of taking care of your health. It can help your doctor find and treat any problems you have. But it's also important for preventing health problems.  A routine physical is different from a \"sick visit.\" A sick visit is when you see a doctor because of a health concern or problem. Since physicals are scheduled ahead of time, you can think about what you want to ask the doctor.  How often should I get a physical? -- It depends on your age and health. In general, for people age 21 years and older:   If you are younger than 50 years, you might be able to get a physical every 3 years.   If you are 50 years or older, your doctor might recommend a physical every year.  If you have an ongoing health condition, like diabetes or high blood pressure, your doctor will probably want to see you more often.  What happens during a physical? -- In general, each visit will include:   Physical exam - The doctor or nurse will check your height, weight, heart rate, and blood pressure. They will also look at your eyes and ears. They will ask about how you are feeling and whether you have any symptoms that bother you.   Medicines - It's a good idea to bring a list of all the medicines you take to each doctor visit. Your doctor will talk to you about your medicines and answer any questions. Tell them if you are having any side effects that bother you. You " "should also tell them if you are having trouble paying for any of your medicines.   Habits and behaviors - This includes:   Your diet   Your exercise habits   Whether you smoke, drink alcohol, or use drugs   Whether you are sexually active   Whether you feel safe at home  Your doctor will talk to you about things you can do to improve your health and lower your risk of health problems. They will also offer help and support. For example, if you want to quit smoking, they can give you advice and might prescribe medicines. If you want to improve your diet or get more physical activity, they can help you with this, too.   Lab tests, if needed - The tests you get will depend on your age and situation. For example, your doctor might want to check your:   Cholesterol   Blood sugar   Iron level   Vaccines - The recommended vaccines will depend on your age, health, and what vaccines you already had. Vaccines are very important because they can prevent certain serious or deadly infections.   Discussion of screening - \"Screening\" means checking for diseases or other health problems before they cause symptoms. Your doctor can recommend screening based on your age, risk, and preferences. This might include tests to check for:   Cancer, such as breast, prostate, cervical, ovarian, colorectal, prostate, lung, or skin cancer   Sexually transmitted infections, such as chlamydia and gonorrhea   Mental health conditions like depression and anxiety  Your doctor will talk to you about the different types of screening tests. They can help you decide which screenings to have. They can also explain what the results might mean.   Answering questions - The physical is a good time to ask the doctor or nurse questions about your health. If needed, they can refer you to other doctors or specialists, too.  Adults older than 65 years often need other care, too. As you get older, your doctor will talk to you about:   How to prevent falling at " home   Hearing or vision tests   Memory testing   How to take your medicines safely   Making sure that you have the help and support you need at home  All topics are updated as new evidence becomes available and our peer review process is complete.  This topic retrieved from [employer] on: May 02, 2024.  Topic 387375 Version 1.0  Release: 32.4.3 - C32.122  © 2024 UpToDate, Inc[employer] and/or its affiliates. All rights reserved.  Consumer Information Use and Disclaimer   Disclaimer: This generalized information is a limited summary of diagnosis, treatment, and/or medication information. It is not meant to be comprehensive and should be used as a tool to help the user understand and/or assess potential diagnostic and treatment options. It does NOT include all information about conditions, treatments, medications, side effects, or risks that may apply to a specific patient. It is not intended to be medical advice or a substitute for the medical advice, diagnosis, or treatment of a health care provider based on the health care provider's examination and assessment of a patient's specific and unique circumstances. Patients must speak with a health care provider for complete information about their health, medical questions, and treatment options, including any risks or benefits regarding use of medications. This information does not endorse any treatments or medications as safe, effective, or approved for treating a specific patient. Up[employer]Date, Inc. and its affiliates disclaim any warranty or liability relating to this information or the use thereof.The use of this information is governed by the Terms of Use, available at https://www.woltersFanBoomuwer.com/en/know/clinical-effectiveness-terms. 2024© UpToDate, Inc. and its affiliates and/or licensors. All rights reserved.  Copyright   © 2024 [employer], [employer] and/or its affiliates. All rights reserved.

## 2025-07-08 NOTE — ASSESSMENT & PLAN NOTE
On olanzapine 10 mg daily per psychiatry     Orders:    Lipid panel; Future    Comprehensive metabolic panel; Future    CBC and differential; Future    Hemoglobin A1C With EAG; Future    TSH, 3rd generation with Free T4 reflex; Future

## 2025-07-08 NOTE — ASSESSMENT & PLAN NOTE
Orders:    Lipid panel; Future    Comprehensive metabolic panel; Future    CBC and differential; Future    Hemoglobin A1C With EAG; Future    TSH, 3rd generation with Free T4 reflex; Future

## 2025-08-03 LAB
ALBUMIN SERPL-MCNC: 4.5 G/DL (ref 3.6–5.1)
ALBUMIN/GLOB SERPL: 2 (CALC) (ref 1–2.5)
ALP SERPL-CCNC: 74 U/L (ref 36–130)
ALT SERPL-CCNC: 25 U/L (ref 9–46)
AST SERPL-CCNC: 16 U/L (ref 10–40)
BASOPHILS # BLD AUTO: 30 CELLS/UL (ref 0–200)
BASOPHILS NFR BLD AUTO: 0.4 %
BILIRUB SERPL-MCNC: 0.4 MG/DL (ref 0.2–1.2)
BUN SERPL-MCNC: 16 MG/DL (ref 7–25)
BUN/CREAT SERPL: NORMAL (CALC) (ref 6–22)
CALCIUM SERPL-MCNC: 9.2 MG/DL (ref 8.6–10.3)
CHLORIDE SERPL-SCNC: 103 MMOL/L (ref 98–110)
CHOLEST SERPL-MCNC: 224 MG/DL
CHOLEST/HDLC SERPL: 4.7 (CALC)
CO2 SERPL-SCNC: 28 MMOL/L (ref 20–32)
CREAT SERPL-MCNC: 0.93 MG/DL (ref 0.6–1.29)
EOSINOPHIL # BLD AUTO: 144 CELLS/UL (ref 15–500)
EOSINOPHIL NFR BLD AUTO: 1.9 %
ERYTHROCYTE [DISTWIDTH] IN BLOOD BY AUTOMATED COUNT: 12.6 % (ref 11–15)
EST. AVERAGE GLUCOSE BLD GHB EST-MCNC: 114 MG/DL
EST. AVERAGE GLUCOSE BLD GHB EST-SCNC: 6.3 MMOL/L
GFR/BSA.PRED SERPLBLD CYS-BASED-ARV: 104 ML/MIN/1.73M2
GLOBULIN SER CALC-MCNC: 2.3 G/DL (CALC) (ref 1.9–3.7)
GLUCOSE SERPL-MCNC: 97 MG/DL (ref 65–99)
HBA1C MFR BLD: 5.6 %
HCT VFR BLD AUTO: 46.6 % (ref 38.5–50)
HDLC SERPL-MCNC: 48 MG/DL
HGB BLD-MCNC: 15.3 G/DL (ref 13.2–17.1)
LDLC SERPL CALC-MCNC: 142 MG/DL (CALC)
LYMPHOCYTES # BLD AUTO: 2379 CELLS/UL (ref 850–3900)
LYMPHOCYTES NFR BLD AUTO: 31.3 %
MCH RBC QN AUTO: 30.4 PG (ref 27–33)
MCHC RBC AUTO-ENTMCNC: 32.8 G/DL (ref 32–36)
MCV RBC AUTO: 92.6 FL (ref 80–100)
MONOCYTES # BLD AUTO: 524 CELLS/UL (ref 200–950)
MONOCYTES NFR BLD AUTO: 6.9 %
NEUTROPHILS # BLD AUTO: 4522 CELLS/UL (ref 1500–7800)
NEUTROPHILS NFR BLD AUTO: 59.5 %
NONHDLC SERPL-MCNC: 176 MG/DL (CALC)
PLATELET # BLD AUTO: 298 THOUSAND/UL (ref 140–400)
PMV BLD REES-ECKER: 8.6 FL (ref 7.5–12.5)
POTASSIUM SERPL-SCNC: 4.4 MMOL/L (ref 3.5–5.3)
PROT SERPL-MCNC: 6.8 G/DL (ref 6.1–8.1)
RBC # BLD AUTO: 5.03 MILLION/UL (ref 4.2–5.8)
SODIUM SERPL-SCNC: 140 MMOL/L (ref 135–146)
TRIGL SERPL-MCNC: 196 MG/DL
TSH SERPL-ACNC: 1.74 MIU/L (ref 0.4–4.5)
WBC # BLD AUTO: 7.6 THOUSAND/UL (ref 3.8–10.8)